# Patient Record
Sex: FEMALE | Race: WHITE | Employment: OTHER | URBAN - METROPOLITAN AREA
[De-identification: names, ages, dates, MRNs, and addresses within clinical notes are randomized per-mention and may not be internally consistent; named-entity substitution may affect disease eponyms.]

---

## 2017-04-04 ENCOUNTER — OFFICE VISIT (OUTPATIENT)
Dept: INTERNAL MEDICINE CLINIC | Facility: CLINIC | Age: 77
End: 2017-04-04

## 2017-04-04 VITALS
WEIGHT: 185 LBS | HEIGHT: 66 IN | TEMPERATURE: 98 F | OXYGEN SATURATION: 96 % | SYSTOLIC BLOOD PRESSURE: 116 MMHG | DIASTOLIC BLOOD PRESSURE: 74 MMHG | RESPIRATION RATE: 15 BRPM | BODY MASS INDEX: 29.73 KG/M2 | HEART RATE: 66 BPM

## 2017-04-04 DIAGNOSIS — E78.2 MIXED HYPERLIPIDEMIA: ICD-10-CM

## 2017-04-04 DIAGNOSIS — E66.3 OVERWEIGHT (BMI 25.0-29.9): ICD-10-CM

## 2017-04-04 DIAGNOSIS — Z80.0 FAMILY HISTORY OF COLON CANCER: ICD-10-CM

## 2017-04-04 DIAGNOSIS — I25.10 CORONARY ARTERY DISEASE INVOLVING NATIVE CORONARY ARTERY OF NATIVE HEART WITHOUT ANGINA PECTORIS: ICD-10-CM

## 2017-04-04 DIAGNOSIS — Z00.00 ENCOUNTER FOR ANNUAL HEALTH EXAMINATION: Primary | ICD-10-CM

## 2017-04-04 DIAGNOSIS — M16.11 PRIMARY OSTEOARTHRITIS OF RIGHT HIP: ICD-10-CM

## 2017-04-04 DIAGNOSIS — I10 ESSENTIAL HYPERTENSION: ICD-10-CM

## 2017-04-04 PROCEDURE — 96160 PT-FOCUSED HLTH RISK ASSMT: CPT | Performed by: INTERNAL MEDICINE

## 2017-04-04 PROCEDURE — G0439 PPPS, SUBSEQ VISIT: HCPCS | Performed by: INTERNAL MEDICINE

## 2017-04-04 PROCEDURE — 99397 PER PM REEVAL EST PAT 65+ YR: CPT | Performed by: INTERNAL MEDICINE

## 2017-04-04 RX ORDER — LISINOPRIL 20 MG/1
20 TABLET ORAL DAILY
COMMUNITY
Start: 2017-02-09 | End: 2019-09-23

## 2017-04-04 NOTE — PATIENT INSTRUCTIONS
Please have your blood-work completed in May, 2017. Please bring in a copy of your healthcare living will and medical power of . You need 3 eight ounce servings of calcium/vitamin D daily.  This includes spinach, kale, broccoli, almonds, milk,

## 2017-04-04 NOTE — PROGRESS NOTES
HPI:   Brittnee Swift is a 65year old female who presents for a medicare supervisit and additional issues as noted below. 1. HTN: tolerating lisinopril and metoprolol well. Has not been measuring her home pressures.   2. HLP: tolerating simvastati onset: 79) in her mother; Diabetes in her sister. There is no history of 8 siblings. SOCIAL HISTORY:   She  reports that she quit smoking about 37 years ago. Her smoking use included Cigarettes. She has a 30 pack-year smoking history.  She does not have a clubbing or edema      Visual Acuity  Right Eye Visual Acuity: Uncorrected Right Eye Chart Acuity: 20/30   Left Eye Visual Acuity: Uncorrected Left Eye Chart Acuity: 20/25   Both Eyes Visual Acuity: Uncorrected Both Eyes Chart Acuity: 20/25   Able To [employer] Functional Ability     Bathing or Showering: Able without help    Toileting: Able without help    Dressing: Able without help    Eating: Able without help    Driving: Able without help    Preparing your meals: Able without help    Managing money/bills: patient  PREVENTATIVE SERVICES  INDICATIONS AND SCHEDULE Internal Lab or Procedure External Lab or Procedure   Diabetes Screening      HbgA1C   Annually   Lab Results  Component Value Date   A1C 6.0* 09/23/2013   HGBA1C 6.0* 10/10/2015    No flowsheet data Pneumococcal 13 (Prevnar)  Covered Once after 65 No vaccine history found Please get once after your 65th birthday    Pneumococcal 23 (Pneumovax)  Covered Once after 65 No vaccine history found Please get once after your 65th birthday    Hepatitis B for Mo 05/18/2016 56     LDL-CHOLESTEROL (mg/dL (calc))   Date Value   09/23/2013 53     LDL CHOLESTROL (mg/dL)   Date Value   04/16/2013 84    No flowsheet data found. Dilated Eye exam  Annually No flowsheet data found. No flowsheet data found.     COPD

## 2017-04-25 ENCOUNTER — PRIOR ORIGINAL RECORDS (OUTPATIENT)
Dept: OTHER | Age: 77
End: 2017-04-25

## 2017-04-25 ENCOUNTER — HOSPITAL ENCOUNTER (OUTPATIENT)
Dept: LAB | Facility: HOSPITAL | Age: 77
Discharge: HOME | End: 2017-04-25
Attending: INTERNAL MEDICINE
Payer: MEDICARE

## 2017-04-25 DIAGNOSIS — Z00.00 ENCOUNTER FOR ANNUAL HEALTH EXAMINATION: ICD-10-CM

## 2017-04-25 PROCEDURE — 36415 COLL VENOUS BLD VENIPUNCTURE: CPT | Performed by: INTERNAL MEDICINE

## 2017-04-25 PROCEDURE — 80053 COMPREHEN METABOLIC PANEL: CPT | Performed by: INTERNAL MEDICINE

## 2017-04-25 PROCEDURE — 80061 LIPID PANEL: CPT | Performed by: INTERNAL MEDICINE

## 2017-04-26 LAB
ALKALINE PHOSPHATATE(ALK PHOS): 54 IU/L
BILIRUBIN TOTAL: 0.6 MG/DL
BUN: 20 MG/DL
CALCIUM: 9.5 MG/DL
CHLORIDE: 100 MEQ/L
CHOLESTEROL, TOTAL: 162 MG/DL
CREATININE, SERUM: 0.93 MG/DL
GLUCOSE: 91 MG/DL
HDL CHOLESTEROL: 73 MG/DL
LDL CHOLESTEROL: 65 MG/DL
POTASSIUM, SERUM: 4.3 MEQ/L
PROTEIN, TOTAL: 7.7 G/DL
SGOT (AST): 29 IU/L
SGPT (ALT): 36 IU/L
SODIUM: 136 MEQ/L
TRIGLYCERIDES: 122 MG/DL

## 2017-05-09 ENCOUNTER — PRIOR ORIGINAL RECORDS (OUTPATIENT)
Dept: OTHER | Age: 77
End: 2017-05-09

## 2017-07-24 PROBLEM — S80.01XA CONTUSION OF RIGHT KNEE: Status: ACTIVE | Noted: 2017-07-24

## 2017-07-24 PROBLEM — M17.11 OSTEOARTHRITIS OF RIGHT KNEE, UNSPECIFIED OSTEOARTHRITIS TYPE: Status: ACTIVE | Noted: 2017-07-24

## 2017-08-07 ENCOUNTER — OFFICE VISIT (OUTPATIENT)
Dept: INTERNAL MEDICINE CLINIC | Facility: CLINIC | Age: 77
End: 2017-08-07

## 2017-08-07 ENCOUNTER — TELEPHONE (OUTPATIENT)
Dept: INTERNAL MEDICINE CLINIC | Facility: CLINIC | Age: 77
End: 2017-08-07

## 2017-08-07 VITALS
DIASTOLIC BLOOD PRESSURE: 70 MMHG | WEIGHT: 174.25 LBS | HEIGHT: 66 IN | RESPIRATION RATE: 12 BRPM | BODY MASS INDEX: 28 KG/M2 | OXYGEN SATURATION: 98 % | TEMPERATURE: 98 F | SYSTOLIC BLOOD PRESSURE: 130 MMHG | HEART RATE: 61 BPM

## 2017-08-07 DIAGNOSIS — E78.2 MIXED HYPERLIPIDEMIA: ICD-10-CM

## 2017-08-07 DIAGNOSIS — I10 ESSENTIAL HYPERTENSION: ICD-10-CM

## 2017-08-07 DIAGNOSIS — R25.2 LEG CRAMPING: Primary | ICD-10-CM

## 2017-08-07 DIAGNOSIS — I25.10 CORONARY ARTERY DISEASE INVOLVING NATIVE CORONARY ARTERY OF NATIVE HEART WITHOUT ANGINA PECTORIS: ICD-10-CM

## 2017-08-07 PROCEDURE — 99214 OFFICE O/P EST MOD 30 MIN: CPT | Performed by: INTERNAL MEDICINE

## 2017-08-07 RX ORDER — LORAZEPAM 0.5 MG/1
0.5 TABLET ORAL
Qty: 4 TABLET | Refills: 0 | Status: SHIPPED | OUTPATIENT
Start: 2017-08-07 | End: 2017-12-20

## 2017-08-07 NOTE — PATIENT INSTRUCTIONS
For your upcoming flight, please try over the counter benadryl 25-50 mg first. If this is not effective in 60 minutes, you can use the prescription ativan (lorazepam). You really need to try to drink 6-8 eight ounce glasses of water daily.  Please drink changes, such as controlling caffeine intake, alcohol, and smoking. Date Last Reviewed: 7/18/2015  © 5595-8872 The [address] [place] [address], [address] [place] [address]. All rights reserved.  This information is not intended as a substitute for also need to take supplements. · Manage stress and learn ways to relax. Deep breathing techniques and visualization can help to relax your muscles and calm your mind. · Exercise regularly. It can help reduce stress.  Also, you will have more energy during

## 2017-08-07 NOTE — PROGRESS NOTES
Jaja Ning is a 65year old female. HPI:   Patient presents for the followin. Bilateral lower extremity cramping but worse in RLE: usually more prominent at night but periodically will happen during the day if she is sitting too long.  They Allergies    Family History   Problem Relation Age of Onset   • Breast Cancer Mother 79   • Diabetes Sister       Past Medical History:   Diagnosis Date   • Cancer [facility])    • Coronary artery disease    • Deafness in left ear    • History of open heart surg lisinopril 40 mg. Home machine is accurate.    # Right Hip OA: doing well with her HEP  # Overweight, BMI 29: weight is stable. She is very healthy.    # Health Maintenance: medicare wellness exam 4/4/2017  Colon Cancer Screening: colonoscopy in 2011 but co

## 2017-09-14 ENCOUNTER — HOSPITAL ENCOUNTER (OUTPATIENT)
Dept: LAB | Facility: HOSPITAL | Age: 77
Discharge: HOME | End: 2017-09-14
Attending: INTERNAL MEDICINE
Payer: MEDICARE

## 2017-09-14 ENCOUNTER — PRIOR ORIGINAL RECORDS (OUTPATIENT)
Dept: OTHER | Age: 77
End: 2017-09-14

## 2017-09-14 DIAGNOSIS — R25.2 LEG CRAMPING: ICD-10-CM

## 2017-09-14 DIAGNOSIS — I25.10 CORONARY ARTERY DISEASE INVOLVING NATIVE CORONARY ARTERY OF NATIVE HEART WITHOUT ANGINA PECTORIS: ICD-10-CM

## 2017-09-14 DIAGNOSIS — E78.2 MIXED HYPERLIPIDEMIA: ICD-10-CM

## 2017-09-14 DIAGNOSIS — I10 ESSENTIAL HYPERTENSION: ICD-10-CM

## 2017-09-14 LAB
ALT (SGPT): 32 U/L
ALT SERPL-CCNC: 32 U/L (ref 14–54)
AST (SGOT): 34 U/L
AST SERPL-CCNC: 34 U/L (ref 15–41)
BUN BLD-MCNC: 20 MG/DL (ref 8–20)
CALCIUM BLD-MCNC: 9.1 MG/DL (ref 8.3–10.3)
CHLORIDE: 103 MMOL/L (ref 101–111)
CHOLEST SMN-MCNC: 155 MG/DL (ref ?–200)
CHOLESTEROL, TOTAL: 155 MG/DL
CO2: 30 MMOL/L (ref 22–32)
CREAT BLD-MCNC: 1.03 MG/DL (ref 0.55–1.02)
GLUCOSE BLD-MCNC: 86 MG/DL (ref 70–99)
HAV IGM SER QL: 2.5 MG/DL (ref 1.7–3)
HDL CHOLESTEROL: 80 MG/DL
HDLC SERPL-MCNC: 80 MG/DL (ref 45–?)
HDLC SERPL: 1.94 {RATIO} (ref ?–4.44)
LDL CHOLESTEROL: 59 MG/DL
LDLC SERPL CALC-MCNC: 59 MG/DL (ref ?–130)
LDLC SERPL-MCNC: 16 MG/DL (ref 5–40)
NONHDLC SERPL-MCNC: 75 MG/DL (ref ?–130)
PHOSPHATE SERPL-MCNC: 3 MG/DL (ref 2.5–4.9)
POTASSIUM SERPL-SCNC: 4.5 MMOL/L (ref 3.6–5.1)
SODIUM SERPL-SCNC: 137 MMOL/L (ref 136–144)
TRIGLYCERIDES: 79 MG/DL
TRIGLYCERIDES: 79 MG/DL (ref ?–150)

## 2017-09-14 PROCEDURE — 84100 ASSAY OF PHOSPHORUS: CPT

## 2017-09-14 PROCEDURE — 84450 TRANSFERASE (AST) (SGOT): CPT | Performed by: INTERNAL MEDICINE

## 2017-09-14 PROCEDURE — 80048 BASIC METABOLIC PNL TOTAL CA: CPT

## 2017-09-14 PROCEDURE — 36415 COLL VENOUS BLD VENIPUNCTURE: CPT | Performed by: INTERNAL MEDICINE

## 2017-09-14 PROCEDURE — 84460 ALANINE AMINO (ALT) (SGPT): CPT | Performed by: INTERNAL MEDICINE

## 2017-09-14 PROCEDURE — 83735 ASSAY OF MAGNESIUM: CPT

## 2017-09-14 PROCEDURE — 80061 LIPID PANEL: CPT | Performed by: INTERNAL MEDICINE

## 2017-12-19 ENCOUNTER — PRIOR ORIGINAL RECORDS (OUTPATIENT)
Dept: OTHER | Age: 77
End: 2017-12-19

## 2017-12-20 ENCOUNTER — OFFICE VISIT (OUTPATIENT)
Dept: INTERNAL MEDICINE CLINIC | Facility: CLINIC | Age: 77
End: 2017-12-20

## 2017-12-20 VITALS
OXYGEN SATURATION: 97 % | RESPIRATION RATE: 16 BRPM | TEMPERATURE: 97 F | BODY MASS INDEX: 29 KG/M2 | HEART RATE: 76 BPM | SYSTOLIC BLOOD PRESSURE: 170 MMHG | WEIGHT: 182.5 LBS | DIASTOLIC BLOOD PRESSURE: 74 MMHG

## 2017-12-20 DIAGNOSIS — J01.90 ACUTE NON-RECURRENT SINUSITIS, UNSPECIFIED LOCATION: Primary | ICD-10-CM

## 2017-12-20 PROCEDURE — 99213 OFFICE O/P EST LOW 20 MIN: CPT | Performed by: NURSE PRACTITIONER

## 2017-12-20 RX ORDER — AZITHROMYCIN 250 MG/1
TABLET, FILM COATED ORAL
Qty: 6 TABLET | Refills: 0 | Status: SHIPPED | OUTPATIENT
Start: 2017-12-20 | End: 2018-05-14

## 2017-12-20 NOTE — PROGRESS NOTES
CHIEF COMPLAINT:   Patient presents with:  URI      HPI:   Lianna Lim is a 65year old female who presents for upper respiratory symptoms for  3 days.  Patient reports sore throat only at the beginning of sx's, congestion, cough with yellow/clear co N/V/C or abdominal pain  NEURO: + frontal headaches    EXAM:   BP (!) 170/74 (BP Location: Left arm, Patient Position: Sitting, Cuff Size: large)   Pulse 76   Temp (!) 97.2 °F (36.2 °C) (Oral)   Resp 16   Wt 182 lb 8 oz   SpO2 97%   Breastfeeding?  No   BMI

## 2018-03-09 ENCOUNTER — HOSPITAL (OUTPATIENT)
Dept: OTHER | Age: 78
End: 2018-03-09
Attending: FAMILY MEDICINE

## 2018-03-12 NOTE — PROGRESS NOTES
Xray left wrist on 3/10/2018: no fracture or dislocation of wrist. Moderate degenerative changes in left wrist articulation.

## 2018-05-11 ENCOUNTER — PRIOR ORIGINAL RECORDS (OUTPATIENT)
Dept: OTHER | Age: 78
End: 2018-05-11

## 2018-05-11 ENCOUNTER — MYAURORA ACCOUNT LINK (OUTPATIENT)
Dept: OTHER | Age: 78
End: 2018-05-11

## 2018-05-14 ENCOUNTER — OFFICE VISIT (OUTPATIENT)
Dept: INTERNAL MEDICINE CLINIC | Facility: CLINIC | Age: 78
End: 2018-05-14

## 2018-05-14 VITALS
HEART RATE: 68 BPM | DIASTOLIC BLOOD PRESSURE: 70 MMHG | BODY MASS INDEX: 29.99 KG/M2 | WEIGHT: 180 LBS | TEMPERATURE: 98 F | HEIGHT: 65 IN | SYSTOLIC BLOOD PRESSURE: 136 MMHG | RESPIRATION RATE: 16 BRPM

## 2018-05-14 DIAGNOSIS — E78.2 MIXED HYPERLIPIDEMIA: ICD-10-CM

## 2018-05-14 DIAGNOSIS — E66.3 OVERWEIGHT (BMI 25.0-29.9): ICD-10-CM

## 2018-05-14 DIAGNOSIS — Z00.00 ROUTINE GENERAL MEDICAL EXAMINATION AT A HEALTH CARE FACILITY: Primary | ICD-10-CM

## 2018-05-14 DIAGNOSIS — I10 ESSENTIAL HYPERTENSION: ICD-10-CM

## 2018-05-14 DIAGNOSIS — M16.11 PRIMARY OSTEOARTHRITIS OF RIGHT HIP: ICD-10-CM

## 2018-05-14 DIAGNOSIS — I25.10 CORONARY ARTERY DISEASE INVOLVING NATIVE CORONARY ARTERY OF NATIVE HEART WITHOUT ANGINA PECTORIS: ICD-10-CM

## 2018-05-14 PROBLEM — J44.9 ASTHMA WITH COPD (CHRONIC OBSTRUCTIVE PULMONARY DISEASE) (HCC): Chronic | Status: RESOLVED | Noted: 2018-05-14 | Resolved: 2018-05-14

## 2018-05-14 PROBLEM — S80.01XA CONTUSION OF RIGHT KNEE: Status: RESOLVED | Noted: 2017-07-24 | Resolved: 2018-05-14

## 2018-05-14 PROBLEM — J44.89 ASTHMA WITH COPD (CHRONIC OBSTRUCTIVE PULMONARY DISEASE) (HCC): Chronic | Status: RESOLVED | Noted: 2018-05-14 | Resolved: 2018-05-14

## 2018-05-14 PROBLEM — J44.89 ASTHMA WITH COPD (CHRONIC OBSTRUCTIVE PULMONARY DISEASE) (HCC): Chronic | Status: ACTIVE | Noted: 2018-05-14

## 2018-05-14 PROBLEM — J44.9 ASTHMA WITH COPD (CHRONIC OBSTRUCTIVE PULMONARY DISEASE) (HCC): Chronic | Status: ACTIVE | Noted: 2018-05-14

## 2018-05-14 PROBLEM — J44.89 ASTHMA WITH COPD (CHRONIC OBSTRUCTIVE PULMONARY DISEASE): Chronic | Status: RESOLVED | Noted: 2018-05-14 | Resolved: 2018-05-14

## 2018-05-14 PROBLEM — M17.11 OSTEOARTHRITIS OF RIGHT KNEE, UNSPECIFIED OSTEOARTHRITIS TYPE: Status: RESOLVED | Noted: 2017-07-24 | Resolved: 2018-05-14

## 2018-05-14 PROBLEM — J44.89 ASTHMA WITH COPD (CHRONIC OBSTRUCTIVE PULMONARY DISEASE): Chronic | Status: ACTIVE | Noted: 2018-05-14

## 2018-05-14 PROCEDURE — 96160 PT-FOCUSED HLTH RISK ASSMT: CPT | Performed by: INTERNAL MEDICINE

## 2018-05-14 PROCEDURE — G0439 PPPS, SUBSEQ VISIT: HCPCS | Performed by: INTERNAL MEDICINE

## 2018-05-14 PROCEDURE — 99397 PER PM REEVAL EST PAT 65+ YR: CPT | Performed by: INTERNAL MEDICINE

## 2018-05-14 NOTE — PATIENT INSTRUCTIONS
You need 3 eight ounce servings of calcium/vitamin D daily. This includes spinach, kale, broccoli, almonds, milk, yogurt, or cottage cheese. Please bring in a copy of your healthcare living will and medical power of .      I also advise you get t

## 2018-05-14 NOTE — PROGRESS NOTES
David Gurrola is a 77year old female. HPI:   Patient presents for medicare supervisit and additional issues noted below. 1. HTN: does not measure BP at home. Seen by cardiology on May 11th and BP was perfect. No med adjustments.    2. HLP: doing adult)   Pulse 68   Temp 98 °F (36.7 °C) (Oral)   Resp 16   Ht 65\"   Wt 180 lb   BMI 29.95 kg/m²   Medicare hearing evaluation: b/l rub test is louder on right  GENERAL: A&O well developed, well nourished,in no apparent distress  SKIN: no rashes,no suspic

## 2018-06-08 ENCOUNTER — PRIOR ORIGINAL RECORDS (OUTPATIENT)
Dept: OTHER | Age: 78
End: 2018-06-08

## 2018-06-08 ENCOUNTER — HOSPITAL ENCOUNTER (OUTPATIENT)
Dept: LAB | Facility: HOSPITAL | Age: 78
Discharge: HOME | End: 2018-06-08
Attending: INTERNAL MEDICINE
Payer: MEDICARE

## 2018-06-08 DIAGNOSIS — E78.2 MIXED HYPERLIPIDEMIA: ICD-10-CM

## 2018-06-08 DIAGNOSIS — E66.3 OVERWEIGHT (BMI 25.0-29.9): ICD-10-CM

## 2018-06-08 DIAGNOSIS — I25.10 CORONARY ARTERY DISEASE INVOLVING NATIVE CORONARY ARTERY OF NATIVE HEART WITHOUT ANGINA PECTORIS: ICD-10-CM

## 2018-06-08 DIAGNOSIS — Z00.00 ROUTINE GENERAL MEDICAL EXAMINATION AT A HEALTH CARE FACILITY: ICD-10-CM

## 2018-06-08 DIAGNOSIS — I10 ESSENTIAL HYPERTENSION: ICD-10-CM

## 2018-06-08 DIAGNOSIS — M16.11 PRIMARY OSTEOARTHRITIS OF RIGHT HIP: ICD-10-CM

## 2018-06-08 PROCEDURE — 84450 TRANSFERASE (AST) (SGOT): CPT

## 2018-06-08 PROCEDURE — 80061 LIPID PANEL: CPT

## 2018-06-08 PROCEDURE — 84460 ALANINE AMINO (ALT) (SGPT): CPT

## 2018-06-08 PROCEDURE — 36415 COLL VENOUS BLD VENIPUNCTURE: CPT

## 2018-06-08 PROCEDURE — 80048 BASIC METABOLIC PNL TOTAL CA: CPT

## 2018-06-18 ENCOUNTER — PRIOR ORIGINAL RECORDS (OUTPATIENT)
Dept: OTHER | Age: 78
End: 2018-06-18

## 2018-06-18 ENCOUNTER — TELEPHONE (OUTPATIENT)
Dept: INTERNAL MEDICINE CLINIC | Facility: CLINIC | Age: 78
End: 2018-06-18

## 2018-06-18 LAB
ALT (SGPT): 45 U/L
AST (SGOT): 32 U/L
BUN: 17 MG/DL
CALCIUM: 9 MG/DL
CHLORIDE: 103 MEQ/L
CHOLESTEROL, TOTAL: 142 MG/DL
CREATININE, SERUM: 0.86 MG/DL
GLUCOSE: 95 MG/DL
GLUCOSE: 95 MG/DL
HDL CHOLESTEROL: 67 MG/DL
LDL CHOLESTEROL: 55 MG/DL
NON-HDL CHOLESTEROL: 75 MG/DL
POTASSIUM, SERUM: 4.1 MEQ/L
SODIUM: 139 MEQ/L
TRIGLYCERIDES: 98 MG/DL

## 2018-06-18 NOTE — TELEPHONE ENCOUNTER
Patient calling for lab results.   Does not use [employer] any longer - cannot receive any results through [employer]

## 2019-02-28 VITALS
HEART RATE: 56 BPM | SYSTOLIC BLOOD PRESSURE: 126 MMHG | BODY MASS INDEX: 28.61 KG/M2 | WEIGHT: 178 LBS | DIASTOLIC BLOOD PRESSURE: 64 MMHG | HEIGHT: 66 IN

## 2019-03-01 VITALS
HEART RATE: 92 BPM | SYSTOLIC BLOOD PRESSURE: 132 MMHG | WEIGHT: 184 LBS | DIASTOLIC BLOOD PRESSURE: 64 MMHG | HEIGHT: 66 IN | BODY MASS INDEX: 29.57 KG/M2

## 2019-04-08 RX ORDER — METOPROLOL TARTRATE 50 MG/1
TABLET, FILM COATED ORAL
COMMUNITY
Start: 2017-07-17 | End: 2019-07-14 | Stop reason: SDUPTHER

## 2019-04-08 RX ORDER — DIPHENOXYLATE HYDROCHLORIDE AND ATROPINE SULFATE 2.5; .025 MG/1; MG/1
TABLET ORAL
COMMUNITY
End: 2020-12-16

## 2019-04-08 RX ORDER — SIMVASTATIN 40 MG
TABLET ORAL
COMMUNITY
Start: 2017-10-16 | End: 2019-07-14 | Stop reason: SDUPTHER

## 2019-04-08 RX ORDER — LISINOPRIL 20 MG/1
TABLET ORAL
COMMUNITY
Start: 2017-10-16 | End: 2019-07-14 | Stop reason: SDUPTHER

## 2019-06-14 ENCOUNTER — APPOINTMENT (OUTPATIENT)
Dept: CARDIOLOGY | Age: 79
End: 2019-06-14

## 2019-06-20 ENCOUNTER — TELEPHONE (OUTPATIENT)
Dept: INTERNAL MEDICINE CLINIC | Facility: CLINIC | Age: 79
End: 2019-06-20

## 2019-06-20 ENCOUNTER — TELEPHONE (OUTPATIENT)
Dept: CARDIOLOGY | Age: 79
End: 2019-06-20

## 2019-06-20 DIAGNOSIS — E78.00 HYPERCHOLESTEREMIA: ICD-10-CM

## 2019-06-20 DIAGNOSIS — I25.10 CAD IN NATIVE ARTERY: Primary | ICD-10-CM

## 2019-06-20 NOTE — TELEPHONE ENCOUNTER
Patient has an appointment scheduled on 07/10/19 for her MAS and is requesting for her blood work orders to be inputted prior to her visit.

## 2019-06-21 ENCOUNTER — HOSPITAL ENCOUNTER (OUTPATIENT)
Dept: LAB | Facility: HOSPITAL | Age: 79
Discharge: HOME | End: 2019-06-21
Attending: INTERNAL MEDICINE
Payer: MEDICARE

## 2019-06-21 DIAGNOSIS — Z00.00 LABORATORY EXAM ORDERED AS PART OF ROUTINE GENERAL MEDICAL EXAMINATION: ICD-10-CM

## 2019-06-21 DIAGNOSIS — I10 ESSENTIAL HYPERTENSION: ICD-10-CM

## 2019-06-21 DIAGNOSIS — I25.10 CORONARY ARTERY DISEASE INVOLVING NATIVE CORONARY ARTERY OF NATIVE HEART WITHOUT ANGINA PECTORIS: ICD-10-CM

## 2019-06-21 PROCEDURE — 80048 BASIC METABOLIC PNL TOTAL CA: CPT

## 2019-06-21 PROCEDURE — 84460 ALANINE AMINO (ALT) (SGPT): CPT

## 2019-06-21 PROCEDURE — 80061 LIPID PANEL: CPT

## 2019-06-21 PROCEDURE — 84450 TRANSFERASE (AST) (SGOT): CPT

## 2019-06-21 PROCEDURE — 36415 COLL VENOUS BLD VENIPUNCTURE: CPT

## 2019-07-07 ENCOUNTER — MA CHART PREP (OUTPATIENT)
Dept: FAMILY MEDICINE CLINIC | Facility: CLINIC | Age: 79
End: 2019-07-07

## 2019-07-10 ENCOUNTER — OFFICE VISIT (OUTPATIENT)
Dept: INTERNAL MEDICINE CLINIC | Facility: CLINIC | Age: 79
End: 2019-07-10
Payer: COMMERCIAL

## 2019-07-10 VITALS
RESPIRATION RATE: 16 BRPM | HEART RATE: 80 BPM | WEIGHT: 178.5 LBS | HEIGHT: 65 IN | OXYGEN SATURATION: 94 % | TEMPERATURE: 98 F | BODY MASS INDEX: 29.74 KG/M2 | SYSTOLIC BLOOD PRESSURE: 134 MMHG | DIASTOLIC BLOOD PRESSURE: 70 MMHG

## 2019-07-10 DIAGNOSIS — F32.0 CURRENT MILD EPISODE OF MAJOR DEPRESSIVE DISORDER WITHOUT PRIOR EPISODE (HCC): ICD-10-CM

## 2019-07-10 DIAGNOSIS — E78.2 MIXED HYPERLIPIDEMIA: ICD-10-CM

## 2019-07-10 DIAGNOSIS — E66.3 OVERWEIGHT (BMI 25.0-29.9): ICD-10-CM

## 2019-07-10 DIAGNOSIS — I25.10 CORONARY ARTERY DISEASE INVOLVING NATIVE CORONARY ARTERY OF NATIVE HEART WITHOUT ANGINA PECTORIS: Primary | ICD-10-CM

## 2019-07-10 DIAGNOSIS — I10 ESSENTIAL HYPERTENSION: ICD-10-CM

## 2019-07-10 PROBLEM — F39 MOOD DISORDER (HCC): Status: ACTIVE | Noted: 2019-07-10

## 2019-07-10 PROCEDURE — 96160 PT-FOCUSED HLTH RISK ASSMT: CPT | Performed by: INTERNAL MEDICINE

## 2019-07-10 PROCEDURE — G0439 PPPS, SUBSEQ VISIT: HCPCS | Performed by: INTERNAL MEDICINE

## 2019-07-10 PROCEDURE — 99397 PER PM REEVAL EST PAT 65+ YR: CPT | Performed by: INTERNAL MEDICINE

## 2019-07-10 RX ORDER — CITALOPRAM 10 MG/1
10 TABLET ORAL DAILY
Qty: 30 TABLET | Refills: 1 | Status: SHIPPED | OUTPATIENT
Start: 2019-07-10 | End: 2019-09-23

## 2019-07-10 NOTE — PROGRESS NOTES
Vilma Segura is a [de-identified]year old female. HPI:   Patient presents for medicare supervisit and additional issues noted below. 1. CADz, HTN, HLP: tolerating medications well. 2. Overweight: weight is stable. She is doing water aerobics.  She also wal Procedure Laterality Date   • CABG  2011    1 vessel, LM      Social History:    Social History    Tobacco Use      Smoking status: Former Smoker        Packs/day: 1.50        Years: 20.00        Pack years: 30        Types: Cigarettes        Quit date: Breast Cancer Screening: normal mammogram in 5/2016, no longer wants screening. Bone Health: on calcium/vit D, already doing weight bearing exercises. DEXA normal 3/2005. Repeat in 2020. Vaccines: Shingles 2013, Pneumovac 2013. Prevnar 13 in 2016.  TD

## 2019-07-14 ENCOUNTER — TELEPHONE (OUTPATIENT)
Dept: CARDIOLOGY | Age: 79
End: 2019-07-14

## 2019-07-15 ENCOUNTER — TELEPHONE (OUTPATIENT)
Dept: INTERNAL MEDICINE CLINIC | Facility: CLINIC | Age: 79
End: 2019-07-15

## 2019-07-15 NOTE — TELEPHONE ENCOUNTER
Patient was in for OV and was prescribed Citalopram Hydrobromide 10 MG.  Patient decided that she will not be taking this medication  Please call if there's any issues

## 2019-07-16 ENCOUNTER — TELEPHONE (OUTPATIENT)
Dept: CARDIOLOGY | Age: 79
End: 2019-07-16

## 2019-07-16 RX ORDER — METOPROLOL TARTRATE 50 MG/1
TABLET, FILM COATED ORAL
Qty: 45 TABLET | Refills: 1 | Status: SHIPPED | OUTPATIENT
Start: 2019-07-16 | End: 2020-12-29 | Stop reason: SDUPTHER

## 2019-07-16 RX ORDER — SIMVASTATIN 40 MG
TABLET ORAL
Qty: 30 TABLET | Refills: 1 | Status: SHIPPED | OUTPATIENT
Start: 2019-07-16 | End: 2020-12-16

## 2019-07-16 RX ORDER — LISINOPRIL 20 MG/1
TABLET ORAL
Qty: 30 TABLET | Refills: 1 | Status: SHIPPED | OUTPATIENT
Start: 2019-07-16 | End: 2020-12-16

## 2019-07-26 ENCOUNTER — APPOINTMENT (OUTPATIENT)
Dept: CARDIOLOGY | Age: 79
End: 2019-07-26

## 2019-09-23 ENCOUNTER — OFFICE VISIT (OUTPATIENT)
Dept: INTERNAL MEDICINE CLINIC | Facility: CLINIC | Age: 79
End: 2019-09-23
Payer: COMMERCIAL

## 2019-09-23 ENCOUNTER — HOSPITAL ENCOUNTER (OUTPATIENT)
Dept: GENERAL RADIOLOGY | Age: 79
Discharge: HOME | End: 2019-09-23
Attending: INTERNAL MEDICINE
Payer: MEDICARE

## 2019-09-23 VITALS
DIASTOLIC BLOOD PRESSURE: 84 MMHG | HEIGHT: 65 IN | HEART RATE: 64 BPM | BODY MASS INDEX: 30.41 KG/M2 | WEIGHT: 182.5 LBS | SYSTOLIC BLOOD PRESSURE: 140 MMHG | RESPIRATION RATE: 16 BRPM | TEMPERATURE: 98 F

## 2019-09-23 DIAGNOSIS — R07.89 RIGHT-SIDED CHEST WALL PAIN: ICD-10-CM

## 2019-09-23 DIAGNOSIS — E78.2 MIXED HYPERLIPIDEMIA: ICD-10-CM

## 2019-09-23 DIAGNOSIS — Z91.81 STATUS POST FALL: Primary | ICD-10-CM

## 2019-09-23 DIAGNOSIS — I10 ESSENTIAL HYPERTENSION: ICD-10-CM

## 2019-09-23 DIAGNOSIS — Z91.81 STATUS POST FALL: ICD-10-CM

## 2019-09-23 DIAGNOSIS — I25.10 CORONARY ARTERY DISEASE INVOLVING NATIVE CORONARY ARTERY OF NATIVE HEART WITHOUT ANGINA PECTORIS: ICD-10-CM

## 2019-09-23 DIAGNOSIS — M25.531 RIGHT WRIST PAIN: ICD-10-CM

## 2019-09-23 PROCEDURE — 99214 OFFICE O/P EST MOD 30 MIN: CPT | Performed by: INTERNAL MEDICINE

## 2019-09-23 PROCEDURE — 71101 X-RAY EXAM UNILAT RIBS/CHEST: CPT | Performed by: INTERNAL MEDICINE

## 2019-09-23 RX ORDER — SIMVASTATIN 40 MG
40 TABLET ORAL NIGHTLY
Qty: 90 TABLET | Refills: 0 | Status: SHIPPED | OUTPATIENT
Start: 2019-09-23 | End: 2021-02-01

## 2019-09-23 RX ORDER — METOPROLOL TARTRATE 50 MG/1
TABLET, FILM COATED ORAL
Qty: 135 TABLET | Refills: 0 | Status: SHIPPED | OUTPATIENT
Start: 2019-09-23 | End: 2021-02-01 | Stop reason: DRUGHIGH

## 2019-09-23 RX ORDER — LISINOPRIL 20 MG/1
20 TABLET ORAL DAILY
Qty: 90 TABLET | Refills: 0 | Status: SHIPPED | OUTPATIENT
Start: 2019-09-23 | End: 2019-09-23

## 2019-09-23 RX ORDER — OMEPRAZOLE 40 MG/1
40 CAPSULE, DELAYED RELEASE ORAL DAILY
Qty: 30 CAPSULE | Refills: 0 | Status: SHIPPED | OUTPATIENT
Start: 2019-09-23 | End: 2019-10-30

## 2019-09-23 RX ORDER — SIMVASTATIN 40 MG
40 TABLET ORAL NIGHTLY
Qty: 90 TABLET | Refills: 0 | Status: SHIPPED | OUTPATIENT
Start: 2019-09-23 | End: 2019-09-23

## 2019-09-23 RX ORDER — LISINOPRIL 20 MG/1
20 TABLET ORAL DAILY
Qty: 90 TABLET | Refills: 0 | Status: SHIPPED | OUTPATIENT
Start: 2019-09-23 | End: 2021-02-01

## 2019-09-23 RX ORDER — METOPROLOL TARTRATE 50 MG/1
TABLET, FILM COATED ORAL
Qty: 135 TABLET | Refills: 0 | Status: SHIPPED | OUTPATIENT
Start: 2019-09-23 | End: 2019-09-23

## 2019-09-23 RX ORDER — MELOXICAM 7.5 MG/1
7.5 TABLET ORAL 2 TIMES DAILY PRN
Qty: 30 TABLET | Refills: 1 | Status: SHIPPED | OUTPATIENT
Start: 2019-09-23 | End: 2020-01-10

## 2019-09-23 NOTE — PROGRESS NOTES
Matthias Lowe is a [de-identified]year old female. HPI:   Patient presents with:  Fall: Pt states she fell 10 days ago on her front stoop. She feels pain to her right side rib area. She is going out of town and she doesnt feel well.  She is taking Aleve 3 x joni exertional chest pain  GI: denies nausea/emesis/ abdominal pain diarrhea constipation  MUSCULOSKELETAL: right wrist, chest wall pain  NEURO: denies headaches  ALLERGY: No Known Allergies  PAST HISTORY:     Current Outpatient Medications:   •  Meloxicam 7.5 atraumatic, PERRLA, EOMI, normal lid and conjunctiva  LUNGS: clear to auscultation bilaterally, no wheezing/rubs  CARDIO: RRR without murmurs. No clubbing, cyanosis or edema.   GI: soft non tender nondistended no hepatosplenomegaly, bowel sounds throughout Dispense: 90 tablet; Refill: 0    6. Coronary artery disease involving native coronary artery of native heart without angina pectoris  No anginal symptoms. On beta blocker, ACEi, aspirin.   In the process of switching cardiologists from Dr. Licha Duron to [facility]

## 2019-09-23 NOTE — PATIENT INSTRUCTIONS
- We will check rib x-rays today. This will help us figure out how long we expect your pain/symptoms to continue for. - Stop Aleve and take prescription anti-inflammatory (meloxicam) instead. Take 1 tablet twice daily as needed. Take with food.   - For

## 2019-09-24 ENCOUNTER — TELEPHONE (OUTPATIENT)
Dept: INTERNAL MEDICINE CLINIC | Facility: CLINIC | Age: 79
End: 2019-09-24

## 2019-09-24 NOTE — TELEPHONE ENCOUNTER
Pt informed of xray results. Pt states Meloxicam is not helping with pain and declining to change to a different medication.   Instructed to notify office if s/s worsen or persist.

## 2019-10-16 ENCOUNTER — TELEPHONE (OUTPATIENT)
Dept: INTERNAL MEDICINE CLINIC | Facility: CLINIC | Age: 79
End: 2019-10-16

## 2019-10-16 DIAGNOSIS — R42 POSTURAL DIZZINESS: ICD-10-CM

## 2019-10-16 DIAGNOSIS — Z91.81 STATUS POST FALL: Primary | ICD-10-CM

## 2019-10-16 NOTE — TELEPHONE ENCOUNTER
Recommend CT head without contrast (order placed), and follow up with Neurology (Dr. Baez  [phone]).

## 2019-10-16 NOTE — TELEPHONE ENCOUNTER
Patient provided with MD instructions listed below and verbalized understanding. She stated that our locations are too far from her home. Advised pts that she can contact insurance for neurologists closer to her home. Pt agreed.

## 2019-10-16 NOTE — TELEPHONE ENCOUNTER
Patient calling in, seeking recommendations on what she should do, or where she should be seen. Pt stated she fell and hit her head 90 days ago and still feels dizzy, especially when she bends over to put her shoes on. Please call pt to discuss.

## 2019-10-17 ENCOUNTER — HOSPITAL ENCOUNTER (OUTPATIENT)
Dept: CT IMAGING | Age: 79
Discharge: HOME | End: 2019-10-17
Attending: INTERNAL MEDICINE
Payer: MEDICARE

## 2019-10-17 ENCOUNTER — TELEPHONE (OUTPATIENT)
Dept: INTERNAL MEDICINE CLINIC | Facility: CLINIC | Age: 79
End: 2019-10-17

## 2019-10-17 DIAGNOSIS — Z91.81 STATUS POST FALL: Primary | ICD-10-CM

## 2019-10-17 DIAGNOSIS — R42 POSTURAL DIZZINESS: ICD-10-CM

## 2019-10-17 DIAGNOSIS — Z91.81 STATUS POST FALL: ICD-10-CM

## 2019-10-17 PROCEDURE — 70450 CT HEAD/BRAIN W/O DYE: CPT | Performed by: INTERNAL MEDICINE

## 2019-10-17 NOTE — TELEPHONE ENCOUNTER
No acute findings. No bleeding in the brain (which is main thing we were worried about with her symptoms). Some chronic inflammation noted in the left ear area - if she is having any left-sided ear pain, it might be worth doing a course of antibiotics.

## 2019-10-17 NOTE — TELEPHONE ENCOUNTER
Pt provided with test results. She denies L side ear pain/pressure. Pts story continued to change stating that she had original fall 1 month ago, when she stated yesterday she fell originally 3 months ago than today stated she fell again 9 days ago.

## 2019-10-17 NOTE — TELEPHONE ENCOUNTER
I spoke with pt to see which location pt would like to go to for CT as yesterday she stated our locations were too far. Pt stated that she would like to go to either [facility] or [place] locations.     Informed pt that we will contact insurance company

## 2019-10-17 NOTE — TELEPHONE ENCOUNTER
Pt waiting at the immediate care and wanting to know if she can go home. Results for stat CT as follows:    \"CONCLUSION:    1. Stable chronic microvascular changes, stable atrophy. No acute infarct or hemorrhage.   2. Again noted is sclerosis in the large

## 2019-10-17 NOTE — TELEPHONE ENCOUNTER
Please call radiology and change her CT scan order to STAT. It was originally ordered by Dr. Odalis Rivas. She fell and hit her head again a few days ago. She is set for CT scan for next week on Tuesday but would like it done ASAP, hence the STAT order.     Umu Garnica

## 2019-10-31 RX ORDER — OMEPRAZOLE 40 MG/1
CAPSULE, DELAYED RELEASE ORAL
Qty: 30 CAPSULE | Refills: 1 | Status: SHIPPED | OUTPATIENT
Start: 2019-10-31 | End: 2020-01-10

## 2019-10-31 NOTE — TELEPHONE ENCOUNTER
Failed protocol     Last refill:  9/23/2019 Omeprazole 40 mg #30 NR    LOV:   9/23/2019 Dr Katie Jon RTC ASAP with Dr Wallace Held   No FOV scheduled

## 2020-01-03 ENCOUNTER — TELEPHONE (OUTPATIENT)
Dept: INTERNAL MEDICINE CLINIC | Facility: CLINIC | Age: 80
End: 2020-01-03

## 2020-01-03 NOTE — TELEPHONE ENCOUNTER
Patient called stating that she went to [facility] on 1/1/20 for neck spasms. Patient was prescribed pain meds, but is unable to take them because they make her feel zooned out.  Requesting appointment as soon as possible, but next available isn't until

## 2020-01-03 NOTE — TELEPHONE ENCOUNTER
Per pt was prescribed Norco. Takes once a day. Muscle relaxer given at hospital.     Pt scheduled an ov w Dr Philippe Murphy for 1/10/2020.

## 2020-01-10 ENCOUNTER — OFFICE VISIT (OUTPATIENT)
Dept: INTERNAL MEDICINE CLINIC | Facility: CLINIC | Age: 80
End: 2020-01-10
Payer: COMMERCIAL

## 2020-01-10 VITALS
DIASTOLIC BLOOD PRESSURE: 70 MMHG | RESPIRATION RATE: 16 BRPM | TEMPERATURE: 98 F | HEIGHT: 65 IN | BODY MASS INDEX: 30.07 KG/M2 | SYSTOLIC BLOOD PRESSURE: 128 MMHG | OXYGEN SATURATION: 98 % | HEART RATE: 71 BPM | WEIGHT: 180.5 LBS

## 2020-01-10 DIAGNOSIS — I25.10 CORONARY ARTERY DISEASE INVOLVING NATIVE CORONARY ARTERY OF NATIVE HEART WITHOUT ANGINA PECTORIS: ICD-10-CM

## 2020-01-10 DIAGNOSIS — M54.2 NECK PAIN: ICD-10-CM

## 2020-01-10 DIAGNOSIS — M89.9 DISORDER OF BONE AND CARTILAGE: ICD-10-CM

## 2020-01-10 DIAGNOSIS — M94.9 DISORDER OF BONE AND CARTILAGE: ICD-10-CM
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
## 2020-01-10 DIAGNOSIS — I10 ESSENTIAL HYPERTENSION: ICD-10-CM

## 2020-01-10 DIAGNOSIS — E78.2 MIXED HYPERLIPIDEMIA: ICD-10-CM

## 2020-01-10 DIAGNOSIS — Z00.00 ROUTINE GENERAL MEDICAL EXAMINATION AT A HEALTH CARE FACILITY: Primary | ICD-10-CM

## 2020-01-10 PROBLEM — F39 MOOD DISORDER (HCC): Status: RESOLVED | Noted: 2019-07-10 | Resolved: 2020-01-10

## 2020-01-10 PROBLEM — F32.0 CURRENT MILD EPISODE OF MAJOR DEPRESSIVE DISORDER WITHOUT PRIOR EPISODE (HCC): Status: RESOLVED | Noted: 2019-07-10 | Resolved: 2020-01-10

## 2020-01-10 PROCEDURE — 99397 PER PM REEVAL EST PAT 65+ YR: CPT | Performed by: INTERNAL MEDICINE

## 2020-01-10 PROCEDURE — G0439 PPPS, SUBSEQ VISIT: HCPCS | Performed by: INTERNAL MEDICINE

## 2020-01-10 PROCEDURE — 96160 PT-FOCUSED HLTH RISK ASSMT: CPT | Performed by: INTERNAL MEDICINE

## 2020-01-10 NOTE — PROGRESS NOTES
Garland Mishra is a [de-identified]year old female. HPI:   Patient presents for medicare supervisit and additional issues noted below. 1. She presented to [facility] on 12/31/2019 for neck pain.  The pain started after she took down her ayden dec (82.8 kg)  07/10/19 : 178 lb 8 oz (81 kg)  05/14/18 : 180 lb (81.6 kg)  12/20/17 : 182 lb 8 oz (82.8 kg)  08/07/17 : 174 lb 4 oz (79 kg)      No Known Allergies    Family History   Problem Relation Age of Onset   • Breast Cancer Mother 79   • Diabetes Sist simvastatin, ASA  # HTN: well controlled on lisinopril 40 mg. Home machine is accurate.    # Obese, BMI 30: weight is stable. She is very healthy.    # Health Maintenance: medicare wellness exam 1/10/2020  Stress management: feels she saritha well with stress supervisit.   Nandini Campbell MD

## 2020-01-11 ENCOUNTER — TELEPHONE (OUTPATIENT)
Dept: INTERNAL MEDICINE CLINIC | Facility: CLINIC | Age: 80
End: 2020-01-11

## 2020-01-11 NOTE — TELEPHONE ENCOUNTER
Records Requested from:    [facility])    Fax: [phone]      Awaiting confirmation. Copy of form made and placed in teal accordion file. Original form sent to scanning.

## 2020-01-22 ENCOUNTER — PATIENT OUTREACH (OUTPATIENT)
Dept: CASE MANAGEMENT | Age: 80
End: 2020-01-22

## 2020-02-04 ENCOUNTER — PATIENT OUTREACH (OUTPATIENT)
Dept: CASE MANAGEMENT | Age: 80
End: 2020-02-04

## 2020-09-23 ENCOUNTER — TELEPHONE (OUTPATIENT)
Dept: CARDIOLOGY | Age: 80
End: 2020-09-23

## 2020-12-03 ENCOUNTER — TELEPHONE (OUTPATIENT)
Dept: CARDIOLOGY | Age: 80
End: 2020-12-03

## 2020-12-15 RX ORDER — AMIODARONE HYDROCHLORIDE 200 MG/1
TABLET ORAL
COMMUNITY
Start: 2020-09-24 | End: 2020-12-16

## 2020-12-15 RX ORDER — ATORVASTATIN CALCIUM 20 MG/1
20 TABLET, FILM COATED ORAL DAILY
COMMUNITY
End: 2021-01-08 | Stop reason: SDUPTHER

## 2020-12-15 RX ORDER — VALSARTAN AND HYDROCHLOROTHIAZIDE 160; 12.5 MG/1; MG/1
1 TABLET, FILM COATED ORAL DAILY
COMMUNITY
End: 2021-01-08 | Stop reason: SDUPTHER

## 2020-12-15 RX ORDER — AMLODIPINE BESYLATE 2.5 MG/1
2.5 TABLET ORAL DAILY
COMMUNITY
End: 2021-01-08 | Stop reason: SDUPTHER

## 2020-12-16 ENCOUNTER — OFFICE VISIT (OUTPATIENT)
Dept: CARDIOLOGY | Age: 80
End: 2020-12-16

## 2020-12-16 ENCOUNTER — TELEPHONE (OUTPATIENT)
Dept: CARDIOLOGY | Age: 80
End: 2020-12-16

## 2020-12-16 VITALS
SYSTOLIC BLOOD PRESSURE: 130 MMHG | WEIGHT: 175 LBS | HEIGHT: 66 IN | DIASTOLIC BLOOD PRESSURE: 64 MMHG | BODY MASS INDEX: 28.12 KG/M2 | HEART RATE: 62 BPM

## 2020-12-16 DIAGNOSIS — I25.10 CORONARY ARTERY DISEASE INVOLVING NATIVE HEART WITHOUT ANGINA PECTORIS, UNSPECIFIED VESSEL OR LESION TYPE: Primary | ICD-10-CM

## 2020-12-16 DIAGNOSIS — Z95.1 HX OF CABG: ICD-10-CM

## 2020-12-16 DIAGNOSIS — E78.00 HYPERCHOLESTEREMIA: ICD-10-CM

## 2020-12-16 DIAGNOSIS — I10 HYPERTENSION, UNSPECIFIED TYPE: ICD-10-CM

## 2020-12-16 DIAGNOSIS — R53.83 OTHER FATIGUE: ICD-10-CM

## 2020-12-16 DIAGNOSIS — I48.0 PAROXYSMAL ATRIAL FIBRILLATION (CMD): ICD-10-CM

## 2020-12-16 PROCEDURE — 99215 OFFICE O/P EST HI 40 MIN: CPT | Performed by: INTERNAL MEDICINE

## 2020-12-16 PROCEDURE — 93000 ELECTROCARDIOGRAM COMPLETE: CPT | Performed by: INTERNAL MEDICINE

## 2020-12-16 SDOH — HEALTH STABILITY: PHYSICAL HEALTH: ON AVERAGE, HOW MANY DAYS PER WEEK DO YOU ENGAGE IN MODERATE TO STRENUOUS EXERCISE (LIKE A BRISK WALK)?: 0 DAYS

## 2020-12-16 SDOH — HEALTH STABILITY: PHYSICAL HEALTH: ON AVERAGE, HOW MANY MINUTES DO YOU ENGAGE IN EXERCISE AT THIS LEVEL?: 0 MIN

## 2020-12-16 ASSESSMENT — ENCOUNTER SYMPTOMS
BRUISES/BLEEDS EASILY: 0
SUSPICIOUS LESIONS: 0
HEMATOCHEZIA: 0
ALLERGIC/IMMUNOLOGIC COMMENTS: NO NEW FOOD ALLERGIES
CHILLS: 0
WEIGHT LOSS: 0
HEMOPTYSIS: 0
WEIGHT GAIN: 0
COUGH: 0
FEVER: 0

## 2020-12-16 ASSESSMENT — PATIENT HEALTH QUESTIONNAIRE - PHQ9
SUM OF ALL RESPONSES TO PHQ9 QUESTIONS 1 AND 2: 0
SUM OF ALL RESPONSES TO PHQ9 QUESTIONS 1 AND 2: 0
2. FEELING DOWN, DEPRESSED OR HOPELESS: NOT AT ALL
CLINICAL INTERPRETATION OF PHQ2 SCORE: NO FURTHER SCREENING NEEDED
1. LITTLE INTEREST OR PLEASURE IN DOING THINGS: NOT AT ALL
CLINICAL INTERPRETATION OF PHQ9 SCORE: NO FURTHER SCREENING NEEDED

## 2020-12-29 RX ORDER — METOPROLOL TARTRATE 50 MG/1
TABLET, FILM COATED ORAL
Qty: 135 TABLET | Refills: 3 | Status: SHIPPED | OUTPATIENT
Start: 2020-12-29 | End: 2021-01-07 | Stop reason: DRUGHIGH

## 2021-01-01 ENCOUNTER — EXTERNAL RECORD (OUTPATIENT)
Dept: HEALTH INFORMATION MANAGEMENT | Facility: OTHER | Age: 81
End: 2021-01-01

## 2021-01-06 RX ORDER — SODIUM FLUORIDE 6 MG/ML
PASTE, DENTIFRICE DENTAL
COMMUNITY
Start: 2020-12-21

## 2021-01-07 ENCOUNTER — TELEPHONE (OUTPATIENT)
Dept: CARDIOLOGY | Age: 81
End: 2021-01-07

## 2021-01-07 ENCOUNTER — OFFICE VISIT (OUTPATIENT)
Dept: CARDIOLOGY | Age: 81
End: 2021-01-07

## 2021-01-07 VITALS
DIASTOLIC BLOOD PRESSURE: 50 MMHG | HEART RATE: 47 BPM | BODY MASS INDEX: 28.25 KG/M2 | WEIGHT: 175 LBS | SYSTOLIC BLOOD PRESSURE: 102 MMHG

## 2021-01-07 DIAGNOSIS — Z95.1 HX OF CABG: ICD-10-CM

## 2021-01-07 DIAGNOSIS — E78.00 HYPERCHOLESTEREMIA: ICD-10-CM

## 2021-01-07 DIAGNOSIS — I25.10 CORONARY ARTERY DISEASE INVOLVING NATIVE HEART WITHOUT ANGINA PECTORIS, UNSPECIFIED VESSEL OR LESION TYPE: Primary | ICD-10-CM

## 2021-01-07 DIAGNOSIS — I10 HYPERTENSION, UNSPECIFIED TYPE: ICD-10-CM

## 2021-01-07 DIAGNOSIS — I48.0 PAROXYSMAL ATRIAL FIBRILLATION (CMD): ICD-10-CM

## 2021-01-07 PROCEDURE — 3074F SYST BP LT 130 MM HG: CPT | Performed by: NURSE PRACTITIONER

## 2021-01-07 PROCEDURE — 99213 OFFICE O/P EST LOW 20 MIN: CPT | Performed by: NURSE PRACTITIONER

## 2021-01-07 PROCEDURE — 3078F DIAST BP <80 MM HG: CPT | Performed by: NURSE PRACTITIONER

## 2021-01-07 RX ORDER — METOPROLOL TARTRATE 50 MG/1
TABLET, FILM COATED ORAL
Qty: 135 TABLET | Refills: 3 | OUTPATIENT
Start: 2021-01-07

## 2021-01-07 ASSESSMENT — PATIENT HEALTH QUESTIONNAIRE - PHQ9
1. LITTLE INTEREST OR PLEASURE IN DOING THINGS: NOT AT ALL
CLINICAL INTERPRETATION OF PHQ2 SCORE: NO FURTHER SCREENING NEEDED
2. FEELING DOWN, DEPRESSED OR HOPELESS: NOT AT ALL
CLINICAL INTERPRETATION OF PHQ9 SCORE: NO FURTHER SCREENING NEEDED
SUM OF ALL RESPONSES TO PHQ9 QUESTIONS 1 AND 2: 0
SUM OF ALL RESPONSES TO PHQ9 QUESTIONS 1 AND 2: 0

## 2021-01-07 ASSESSMENT — ENCOUNTER SYMPTOMS
ORTHOPNEA: 0
BLOATING: 0
SHORTNESS OF BREATH: 0
SYNCOPE: 0
ABDOMINAL PAIN: 0
NIGHT SWEATS: 0
COUGH: 0
NEAR-SYNCOPE: 0
FEVER: 0

## 2021-01-08 ENCOUNTER — TELEPHONE (OUTPATIENT)
Dept: CARDIOLOGY | Age: 81
End: 2021-01-08

## 2021-01-08 RX ORDER — VALSARTAN AND HYDROCHLOROTHIAZIDE 160; 12.5 MG/1; MG/1
1 TABLET, FILM COATED ORAL DAILY
Qty: 90 TABLET | Refills: 3 | Status: SHIPPED | OUTPATIENT
Start: 2021-01-08

## 2021-01-08 RX ORDER — AMLODIPINE BESYLATE 2.5 MG/1
2.5 TABLET ORAL DAILY
Qty: 90 TABLET | Refills: 3 | Status: SHIPPED | OUTPATIENT
Start: 2021-01-08

## 2021-01-08 RX ORDER — ATORVASTATIN CALCIUM 20 MG/1
20 TABLET, FILM COATED ORAL DAILY
Qty: 90 TABLET | Refills: 0 | Status: SHIPPED | OUTPATIENT
Start: 2021-01-08 | End: 2021-05-06

## 2021-01-14 ENCOUNTER — TELEPHONE (OUTPATIENT)
Dept: CARDIOLOGY | Age: 81
End: 2021-01-14

## 2021-01-19 ENCOUNTER — TELEPHONE (OUTPATIENT)
Dept: CARDIOLOGY | Age: 81
End: 2021-01-19

## 2021-02-01 ENCOUNTER — OFFICE VISIT (OUTPATIENT)
Dept: INTERNAL MEDICINE CLINIC | Facility: CLINIC | Age: 81
End: 2021-02-01
Payer: COMMERCIAL

## 2021-02-01 VITALS
WEIGHT: 179.63 LBS | RESPIRATION RATE: 16 BRPM | OXYGEN SATURATION: 99 % | DIASTOLIC BLOOD PRESSURE: 60 MMHG | HEIGHT: 64.5 IN | HEART RATE: 54 BPM | SYSTOLIC BLOOD PRESSURE: 132 MMHG | TEMPERATURE: 98 F | BODY MASS INDEX: 30.29 KG/M2

## 2021-02-01 DIAGNOSIS — E66.09 CLASS 1 OBESITY DUE TO EXCESS CALORIES WITH SERIOUS COMORBIDITY AND BODY MASS INDEX (BMI) OF 30.0 TO 30.9 IN ADULT: ICD-10-CM

## 2021-02-01 DIAGNOSIS — Z00.00 ROUTINE GENERAL MEDICAL EXAMINATION AT A HEALTH CARE FACILITY: Primary | ICD-10-CM

## 2021-02-01 DIAGNOSIS — I48.0 PAF (PAROXYSMAL ATRIAL FIBRILLATION) (HCC): ICD-10-CM

## 2021-02-01 DIAGNOSIS — I10 ESSENTIAL HYPERTENSION: ICD-10-CM

## 2021-02-01 DIAGNOSIS — I25.10 CORONARY ARTERY DISEASE INVOLVING NATIVE CORONARY ARTERY OF NATIVE HEART WITHOUT ANGINA PECTORIS: ICD-10-CM

## 2021-02-01 DIAGNOSIS — E78.2 MIXED HYPERLIPIDEMIA: ICD-10-CM

## 2021-02-01 PROBLEM — E66.3 OVERWEIGHT (BMI 25.0-29.9): Status: RESOLVED | Noted: 2017-04-04 | Resolved: 2021-02-01

## 2021-02-01 PROCEDURE — 3078F DIAST BP <80 MM HG: CPT | Performed by: INTERNAL MEDICINE

## 2021-02-01 PROCEDURE — 96160 PT-FOCUSED HLTH RISK ASSMT: CPT | Performed by: INTERNAL MEDICINE

## 2021-02-01 PROCEDURE — 3075F SYST BP GE 130 - 139MM HG: CPT | Performed by: INTERNAL MEDICINE

## 2021-02-01 PROCEDURE — G0439 PPPS, SUBSEQ VISIT: HCPCS | Performed by: INTERNAL MEDICINE

## 2021-02-01 PROCEDURE — 99397 PER PM REEVAL EST PAT 65+ YR: CPT | Performed by: INTERNAL MEDICINE

## 2021-02-01 PROCEDURE — 3008F BODY MASS INDEX DOCD: CPT | Performed by: INTERNAL MEDICINE

## 2021-02-01 RX ORDER — VALSARTAN AND HYDROCHLOROTHIAZIDE 160; 12.5 MG/1; MG/1
1 TABLET, FILM COATED ORAL DAILY
COMMUNITY
Start: 2020-12-10

## 2021-02-01 RX ORDER — ATORVASTATIN CALCIUM 20 MG/1
20 TABLET, FILM COATED ORAL DAILY
COMMUNITY
Start: 2020-12-03

## 2021-02-01 RX ORDER — AMIODARONE HYDROCHLORIDE 200 MG/1
TABLET ORAL
COMMUNITY
Start: 2020-11-16 | End: 2021-02-01

## 2021-02-01 RX ORDER — APIXABAN 5 MG/1
5 TABLET, FILM COATED ORAL 2 TIMES DAILY
Status: ON HOLD | COMMUNITY
Start: 2021-01-07 | End: 2021-06-08

## 2021-02-01 RX ORDER — SODIUM FLUORIDE 6 MG/ML
PASTE, DENTIFRICE DENTAL
COMMUNITY
Start: 2020-12-21 | End: 2021-06-11

## 2021-02-01 RX ORDER — AMLODIPINE BESYLATE 2.5 MG/1
2.5 TABLET ORAL DAILY
Status: ON HOLD | COMMUNITY
Start: 2020-12-10 | End: 2021-06-08

## 2021-02-01 NOTE — PROGRESS NOTES
Jaja Ning is a 79year old female. HPI:   Patient presents for medicare supervisit and additional issues noted below. Last labs are in care everwhere in 9/2020  1. HTN: checking BP a few days per week and they are < 130/80s.  Matty [place] Pack years: 30        Types: Cigarettes        Quit date: 1980        Years since quittin.1      Smokeless tobacco: Never Used    Alcohol use: No      Alcohol/week: 0.0 standard drinks    Drug use: No           21  1059   BP: 140/62   Pulse: 2017. shingrix 2018. Gets yearly flu shot. Healthcare Living Will, Medical POA:   first. Would choose son, Opal Aliment otherwise.  Goals of care - full code but no prolonged life support    Care Team:  Elisha Stanton     The patient i

## 2021-02-01 NOTE — PATIENT INSTRUCTIONS
Please complete your fasting labs in March, 2021. You need 3 eight ounce servings of calcium/vitamin D daily. This includes spinach, kale, broccoli, almonds, milk, yogurt, or cottage cheese.

## 2021-02-24 ENCOUNTER — TELEPHONE (OUTPATIENT)
Dept: CARDIOLOGY | Age: 81
End: 2021-02-24

## 2021-03-03 DIAGNOSIS — Z23 NEED FOR VACCINATION: ICD-10-CM

## 2021-03-11 DIAGNOSIS — Z23 NEED FOR VACCINATION: ICD-10-CM

## 2021-05-06 ENCOUNTER — TELEPHONE (OUTPATIENT)
Dept: CARDIOLOGY | Age: 81
End: 2021-05-06

## 2021-05-06 RX ORDER — ATORVASTATIN CALCIUM 20 MG/1
TABLET, FILM COATED ORAL
Qty: 90 TABLET | Refills: 0 | Status: SHIPPED | OUTPATIENT
Start: 2021-05-06

## 2021-06-05 ENCOUNTER — APPOINTMENT (OUTPATIENT)
Dept: GENERAL RADIOLOGY | Facility: HOSPITAL | Age: 81
End: 2021-06-05
Attending: EMERGENCY MEDICINE
Payer: MEDICARE

## 2021-06-05 ENCOUNTER — HOSPITAL ENCOUNTER (OUTPATIENT)
Facility: HOSPITAL | Age: 81
Setting detail: OBSERVATION
Discharge: HOME | End: 2021-06-08
Attending: EMERGENCY MEDICINE | Admitting: INTERNAL MEDICINE
Payer: MEDICARE

## 2021-06-05 DIAGNOSIS — R53.1 WEAKNESS GENERALIZED: ICD-10-CM

## 2021-06-05 DIAGNOSIS — K92.2 UGIB (UPPER GASTROINTESTINAL BLEED): Primary | ICD-10-CM

## 2021-06-05 PROCEDURE — 99220 INITIAL OBSERVATION CARE,LEVL III: CPT | Performed by: INTERNAL MEDICINE

## 2021-06-05 PROCEDURE — 74018 RADEX ABDOMEN 1 VIEW: CPT | Performed by: EMERGENCY MEDICINE

## 2021-06-05 PROCEDURE — 71045 X-RAY EXAM CHEST 1 VIEW: CPT | Performed by: EMERGENCY MEDICINE

## 2021-06-05 RX ORDER — SODIUM CHLORIDE 9 MG/ML
INJECTION, SOLUTION INTRAVENOUS CONTINUOUS
Status: DISCONTINUED | OUTPATIENT
Start: 2021-06-05 | End: 2021-06-08

## 2021-06-05 RX ORDER — ONDANSETRON 2 MG/ML
4 INJECTION INTRAMUSCULAR; INTRAVENOUS EVERY 4 HOURS PRN
Status: DISCONTINUED | OUTPATIENT
Start: 2021-06-05 | End: 2021-06-05

## 2021-06-05 RX ORDER — ACETAMINOPHEN 325 MG/1
650 TABLET ORAL EVERY 6 HOURS PRN
Status: DISCONTINUED | OUTPATIENT
Start: 2021-06-05 | End: 2021-06-08

## 2021-06-05 RX ORDER — AMLODIPINE BESYLATE 2.5 MG/1
2.5 TABLET ORAL DAILY
Status: DISCONTINUED | OUTPATIENT
Start: 2021-06-05 | End: 2021-06-06

## 2021-06-05 RX ORDER — ONDANSETRON 2 MG/ML
4 INJECTION INTRAMUSCULAR; INTRAVENOUS EVERY 6 HOURS PRN
Status: DISCONTINUED | OUTPATIENT
Start: 2021-06-05 | End: 2021-06-08

## 2021-06-05 RX ORDER — ATORVASTATIN CALCIUM 20 MG/1
20 TABLET, FILM COATED ORAL DAILY
Status: DISCONTINUED | OUTPATIENT
Start: 2021-06-05 | End: 2021-06-08

## 2021-06-06 ENCOUNTER — ANESTHESIA (OUTPATIENT)
Dept: ENDOSCOPY | Facility: HOSPITAL | Age: 81
End: 2021-06-06
Payer: MEDICARE

## 2021-06-06 ENCOUNTER — ANESTHESIA EVENT (OUTPATIENT)
Dept: ENDOSCOPY | Facility: HOSPITAL | Age: 81
End: 2021-06-06
Payer: MEDICARE

## 2021-06-06 PROCEDURE — 99214 OFFICE O/P EST MOD 30 MIN: CPT | Performed by: INTERNAL MEDICINE

## 2021-06-06 PROCEDURE — 0DJ08ZZ INSPECTION OF UPPER INTESTINAL TRACT, VIA NATURAL OR ARTIFICIAL OPENING ENDOSCOPIC: ICD-10-PCS | Performed by: STUDENT IN AN ORGANIZED HEALTH CARE EDUCATION/TRAINING PROGRAM

## 2021-06-06 PROCEDURE — 99225 SUBSEQUENT OBSERVATION CARE: CPT | Performed by: INTERNAL MEDICINE

## 2021-06-06 RX ORDER — POTASSIUM CHLORIDE 14.9 MG/ML
20 INJECTION INTRAVENOUS ONCE
Status: COMPLETED | OUTPATIENT
Start: 2021-06-06 | End: 2021-06-06

## 2021-06-06 RX ORDER — ONDANSETRON 2 MG/ML
4 INJECTION INTRAMUSCULAR; INTRAVENOUS AS NEEDED
Status: CANCELLED | OUTPATIENT
Start: 2021-06-06 | End: 2021-06-06

## 2021-06-06 RX ORDER — METOCLOPRAMIDE HYDROCHLORIDE 5 MG/ML
10 INJECTION INTRAMUSCULAR; INTRAVENOUS AS NEEDED
Status: CANCELLED | OUTPATIENT
Start: 2021-06-06 | End: 2021-06-06

## 2021-06-06 RX ORDER — SODIUM CHLORIDE 9 MG/ML
INJECTION, SOLUTION INTRAVENOUS CONTINUOUS
Status: CANCELLED | OUTPATIENT
Start: 2021-06-06

## 2021-06-06 RX ORDER — LIDOCAINE HYDROCHLORIDE 10 MG/ML
INJECTION, SOLUTION EPIDURAL; INFILTRATION; INTRACAUDAL; PERINEURAL AS NEEDED
Status: DISCONTINUED | OUTPATIENT
Start: 2021-06-06 | End: 2021-06-06 | Stop reason: SURG

## 2021-06-06 RX ORDER — NALOXONE HYDROCHLORIDE 0.4 MG/ML
80 INJECTION, SOLUTION INTRAMUSCULAR; INTRAVENOUS; SUBCUTANEOUS AS NEEDED
Status: CANCELLED | OUTPATIENT
Start: 2021-06-06 | End: 2021-06-06

## 2021-06-06 RX ORDER — MAGNESIUM SULFATE HEPTAHYDRATE 40 MG/ML
2 INJECTION, SOLUTION INTRAVENOUS ONCE
Status: COMPLETED | OUTPATIENT
Start: 2021-06-06 | End: 2021-06-06

## 2021-06-06 RX ORDER — PHENYLEPHRINE HCL 10 MG/ML
VIAL (ML) INJECTION AS NEEDED
Status: DISCONTINUED | OUTPATIENT
Start: 2021-06-06 | End: 2021-06-06 | Stop reason: SURG

## 2021-06-06 RX ADMIN — SODIUM CHLORIDE: 9 INJECTION, SOLUTION INTRAVENOUS at 10:03:00

## 2021-06-06 RX ADMIN — PHENYLEPHRINE HCL 200 MCG: 10 MG/ML VIAL (ML) INJECTION at 09:54:00

## 2021-06-06 RX ADMIN — SODIUM CHLORIDE: 9 INJECTION, SOLUTION INTRAVENOUS at 09:50:00

## 2021-06-06 RX ADMIN — LIDOCAINE HYDROCHLORIDE 80 MG: 10 INJECTION, SOLUTION EPIDURAL; INFILTRATION; INTRACAUDAL; PERINEURAL at 09:50:00

## 2021-06-06 NOTE — OPERATIVE REPORT
EGD operative report  Patient Name: Yoni Loss  Procedure: Esophagogastroduodenoscopy   Indication: melena  Attending: Juanita Palafox M.D. Consent:  The risks, benefits, and alternatives were discussed with the patient / POA.   Risks included, but normal.  Impression: Findings as above. Recommendations:  1. Continue IV pantoprazole q12 hours (will need 8 weeks of therapy)  2. Continue to hold Eliquis (day 2)  3. If no further bleeding, clear liquids later today  4. Repeat Hgb later this PM  5.  If t

## 2021-06-06 NOTE — PROGRESS NOTES
NURSING ADMISSION NOTE      Patient admitted via Cart  Oriented to room. Safety precautions initiated. Bed in low position. Call light in reach.     At appx [phone] patient arrived to the floor accompanied by , patient able to ambulate to bed from ca

## 2021-06-06 NOTE — PLAN OF CARE
Patient A&Ox4, night uneventful since event at beginning of shift (please see previous note). No BM at this time. No further concerns. @1349 Per PCT patient with medium  loose, dark stool. @7550 Updates and POC given to son Thom Perales.   @4949 Patient with INTERVENTIONS:  - Assess pt frequently for physical needs  - Identify cognitive and physical deficits and behaviors that affect risk of falls.   - [place] fall precautions as indicated by assessment.  - Educate pt/family on patient safety including physic

## 2021-06-06 NOTE — PROGRESS NOTES
[facility]  Progress Note     Murray Isidoro Patient Status:  Observation    1940 MRN [id number]   Location [id number] [place] [address] Attending Best Calvillo MD   Hosp Day # 0 PCP Joel Irizarry MD     Chief Complaint: GI b 15.5*   INR 1.26* 1.19*            COVID-19 Lab Results    COVID-19  Lab Results   Component Value Date    COVID19 Not Detected 06/05/2021       Pro-Calcitonin  No results for input(s): PCT in the last 168 hours.     Cardiac  No results for input(s): TROP, Tin is expected to be discharge to: home    Plan of care discussed with patient, Luc Rodrigues MD

## 2021-06-06 NOTE — CONSULTS
[facility] [place]  Report of GI Consultation    Matthias Lowe Patient Status:  Observation    1940 MRN [id number]   Location [id number] [place] [address] Attending Melia Hopkins MD   Hosp Day # 0 PCP Joe Akins MD     Date of Admis HCl (ZOFRAN) injection 4 mg, 4 mg, Intravenous, Q6H PRN  metoprolol tartrate (LOPRESSOR) partial tablet 12.5 mg, 12.5 mg, Oral, BID  0.9% NaCl infusion, , Intravenous, Continuous  acetaminophen (TYLENOL) tab 650 mg, 650 mg, Oral, Q6H PRN  Pantoprazole [place] 06/06/21  0641   ALT 27  --  21   AST 19  --  20   ALKPHO 50*  --  36*   BILT 0.5  --  0.5   HGB 11.7* 9.9* 8.7*   WBC 15.1*  --  12.4*       Imaging:  XR ABDOMEN (1 VIEW) (CPT=74018)    Result Date: 6/5/2021  CONCLUSION:  1. No acute findings.   2. Calcifi

## 2021-06-06 NOTE — ANESTHESIA PREPROCEDURE EVALUATION
PRE-OP EVALUATION    Patient Name: Jammie Coleman    Admit Diagnosis: Weakness generalized [R53.1]  UGIB (upper gastrointestinal bleed) [K92.2]    Pre-op Diagnosis: Melena [K92.1]    ESOPHAGOGASTRODUODENOSCOPY (EGD)    Anesthesia Procedure: Cris Mandujano EC, Take 81 mg by mouth daily. , Disp: , Rfl: , 6/5/2021 at Unknown time  PREVIDENT 5000 BOOSTER PLUS 1.1 % Dental Paste, , Disp: , Rfl:         Allergies: Ibuprofen      Anesthesia Evaluation    Patient summary reviewed.     Anesthetic Complications  (-) hi normal.  Breath sounds clear to auscultation bilaterally. Other findings            ASA: 3 and emergent  Plan: MAC  NPO status verified and patient meets guidelines. Comment: Plan for MAC with GA back up.  Risks include but limited to a

## 2021-06-06 NOTE — ANESTHESIA POSTPROCEDURE EVALUATION
1 [facility] Patient Status:  Observation   Age/Gender 79year old female MRN [id number]   Location [id number] [place] [address] Attending Bev Perez MD   [facility] Day # 0 PCP Estiven Copeland MD       Anesthesia Post-op Not

## 2021-06-06 NOTE — H&P
[facility]                                                               History & Physical         Alma Najera Patient Status:  Emergency    1940 MRN [id number]   Location [address] [facility] Attending Taurus Gonzalez RN and will transfuse as needed    History:  Past Medical History:   Diagnosis Date   • Atrial fibrillation ([facility] [address]    • Coronary artery disease    • Deafness in left ear    • Hearing loss, left    • History of open heart surgery    • Other and unspecified h Results   Component Value Date    WBC 15.1 06/05/2021    HGB 11.7 06/05/2021    HCT 35.0 06/05/2021    .0 06/05/2021    CREATSERUM 1.05 06/05/2021    BUN 57 06/05/2021     06/05/2021    K 4.1 06/05/2021     06/05/2021    CO2 27.0 06/05/2 effusions.  No pneumothorax.  Median sternotomy approximated by wire sutures.  Atheromatous calcifications of the aorta.                            Impression   CONCLUSION:  No acute pulmonary findings.               Dictated by (CST): MD rose Miller

## 2021-06-06 NOTE — ED PROVIDER NOTES
Patient Seen in: [facility] Emergency Department      History   Patient presents with:  GI Bleeding    Stated Complaint: since 6 am black stools, weakness    HPI/Subjective:   HPI    Patient with history of coronary artery disease, bypass surgery in 87   Temp 97.4 °F (36.3 °C) (Temporal)   Resp 18   Ht 167.6 cm (5' 6\")   Wt 79.4 kg   SpO2 97%   BMI 28.25 kg/m²         Physical Exam    General: Well-developed, well-nourished, elderly, pale, tired appearing, no other acute distress  Head and neck: Norm order CBC With Differential With Platelet.   Procedure                               Abnormality         Status                     ---------                               -----------         ------                     CBC W/ DIFFERENTIAL[[id number]] today, with left shift. This will need to be monitored. We do not have a urinalysis yet. There is no sign of pneumonia on chest x-ray. THE [facility] hospitalist notified. Dr. Issa Goss from [address] [place] [address] contacted.     .  Admission disposition: 6/5/2021  7:42 PM

## 2021-06-06 NOTE — CONSULTS
[facility]  Cardiology Consultation    Susan Severo Patient Status:  Observation    1940 MRN [id number]   [facility]-A Attending Qasim Clark MD   Hosp Day # 0 PCP Janet Love MD     Reason for Consultation:  Hx of 0.9 % 10 mL IV push, 40 mg, Intravenous, Q12H    Review of Systems:  A comprehensive review of systems was negative if not otherwise mention in above HPI.     /44 (BP Location: Left arm)   Pulse 71   Temp 98.3 °F (36.8 °C) (Oral)   Resp 14   Ht 66\" unremarkable last march    Impression:  Principal Problem:    UGIB (upper gastrointestinal bleed)  Active Problems:    Hyperlipidemia    Essential hypertension    Coronary artery disease    PAF (paroxysmal atrial fibrillation) (HCC)    Weakness generalized

## 2021-06-07 PROCEDURE — 99214 OFFICE O/P EST MOD 30 MIN: CPT | Performed by: INTERNAL MEDICINE

## 2021-06-07 PROCEDURE — 99225 SUBSEQUENT OBSERVATION CARE: CPT | Performed by: INTERNAL MEDICINE

## 2021-06-07 RX ORDER — PANTOPRAZOLE SODIUM 40 MG/1
40 TABLET, DELAYED RELEASE ORAL
Qty: 60 TABLET | Refills: 1 | Status: SHIPPED | OUTPATIENT
Start: 2021-06-07 | End: 2021-06-23

## 2021-06-07 RX ORDER — VANCOMYCIN HYDROCHLORIDE 125 MG/1
125 CAPSULE ORAL EVERY 6 HOURS
Status: DISCONTINUED | OUTPATIENT
Start: 2021-06-07 | End: 2021-06-08

## 2021-06-07 NOTE — PLAN OF CARE
Upon reassessment, VSS, afebrile. Pt continues to deny pain. Pt is passing flatus and smear of black/tarry residual when wiping, otherwise, no further BM's post-procedure. Pt tolerating clear liquids. Dr. Costa Salvage notified of earlier episode of SVT.  Per T

## 2021-06-07 NOTE — PLAN OF CARE
Writing RN received call from Lab notifying of positive C-diff result. Dr. Venessa Krabbe (GI) notified and oral Vanco ordered. Dr. Rico Riding at bedside and aware. Pt given verbal and written education of C-diff. Pt is upset and anxious regarding new dx.  Emotio

## 2021-06-07 NOTE — PROGRESS NOTES
[facility] HOSPITALIST  Progress Note     Dolphus Plater Patient Status:  Observation    1940 MRN [id number]   Location [id number] [place] [address] Attending Kiki Price MD   Hosp Day # 0 PCP Nehemiah Mccoy MD     Chief Complaint: GI b TP 7.0 5.9*  --        Estimated Creatinine Clearance: 46.1 mL/min (based on SCr of 0.88 mg/dL).     Recent Labs   Lab 06/05/21  1901 06/06/21  0641   PTP 16.2* 15.5*   INR 1.26* 1.19*            COVID-19 Lab Results    COVID-19  Lab Results   Component no  Estimated date of discharge: tbd  Discharge is dependent on: progress  At this point Ms. Castle is expected to be discharge to: home    Plan of care discussed with patient, Mele Bryson MD

## 2021-06-07 NOTE — PLAN OF CARE
Patient A&Ox4, VSS, IVF continued. Tolerating CLD. Up to bedside commode with SBA. Voiding, no bloody stools this shift remains on contact isolation to r/o cdiff. Night uneventful. Concerns addressed, none further at this time.    Problem: Patient/Family Go precautions as indicated by assessment.  - Educate pt/family on patient safety including physical limitations  - Instruct pt to call for assistance with activity based on assessment  - Modify environment to reduce risk of injury  - Provide assistive device and tolerated  - Evaluate effectiveness of GI medications  - Encourage mobilization and activity  - Obtain nutritional consult as needed  - Establish a toileting routine/schedule  - Consider collaborating with pharmacy to review patient's medication profil

## 2021-06-07 NOTE — PROGRESS NOTES
[facility]  Cardiology Progress Note    Subjective:  No chest pain or shortness of breath. Frustrated about C.diff infection.   Tele reviewed     Objective:  /51 (BP Location: Left arm)   Pulse 93   Temp 97.6 °F (36.4 °C) (Oral)   Resp 18   Ht 5

## 2021-06-07 NOTE — PLAN OF CARE
Upon assessment, VSS, afebrile. Pt denies pain. Pt denies dizziness/lightheadedness. Pt is fatigued/weak. Pt denies chest pain/SOB. 's, NSR-ST on tele monitor. O2 sats stable on room air. CPOX on. Abdomen is soft, nondistended and nontender.  Bowel so - ADULT  Goal: Absence of fever/infection during anticipated neutropenic period  Description: INTERVENTIONS  - Monitor WBC  - Administer growth factors as ordered  - Implement neutropenic guidelines  Outcome: Progressing     Problem: SAFETY ADULT - FALL  G tube to low intermittent suction as ordered  - Evaluate effectiveness of ordered antiemetic medications  - Provide nonpharmacologic comfort measures as appropriate  - Advance diet as tolerated, if ordered  - Obtain nutritional consult as needed  - Evaluate

## 2021-06-07 NOTE — PLAN OF CARE
Upon assessment, VSS, afebrile. Pt is sitting in chair in good spirits. Pt denies pain. Pt reports continued fatigued/weakness, however, improved since yesterday. PT consult placed. Abdomen is soft, nondistended and nontender. Bowel sounds present x4.  Pt t INTERVENTIONS  - Monitor WBC  - Administer growth factors as ordered  - Implement neutropenic guidelines  Outcome: Progressing     Problem: SAFETY ADULT - FALL  Goal: Free from fall injury  Description: INTERVENTIONS:  - Assess pt frequently for physical n medications  - Provide nonpharmacologic comfort measures as appropriate  - Advance diet as tolerated, if ordered  - Obtain nutritional consult as needed  - Evaluate fluid balance  Outcome: Progressing  Goal: Maintains or returns to baseline bowel function

## 2021-06-07 NOTE — DISCHARGE SUMMARY
[facility] HOSPITALIST  DISCHARGE SUMMARY     Eleanor Kirby Patient Status:  Observation    1940 MRN [id number]   [facility] 3NW-A Attending Kamini Rios MD   Hosp Day # 0 PCP Vikram Gonzalez MD     Date of Admission: 2021  Date hemoglobin every 8 hours as discussed with RN and will transfuse as needed    Brief Synopsis: Pt was admitted and evaluated by GI. She went for EGD and was started on PPI. She will continue PPI for 8 weeks and plan for repeat EGD in 4 weeks.  Pt was also fo Besylate 2.5 MG Tabs  Commonly known as: NORVASC              Where to Get Your Medications      These medications were sent to [facility]/pharmacy #[id number]- [place], [place] - [address] [facility] [phone], [address] [address]

## 2021-06-07 NOTE — PLAN OF CARE
Pt returned from Endoscopy @1025. VSS, B/P 122/44, afebrile. Pt hypotensive in recovery. Dr. Avery Grant on unit and notified. 500cc bolus ordered. Norvasc and Lopressor d/c'd. Awaiting post-procedure orders for diet. Will continue to monitor.

## 2021-06-07 NOTE — PROGRESS NOTES
[facility] Gastroenterology Progress Note    CC: melena    S: no melena, hematochezia overnight     O: /69 (BP Location: Right arm)   Pulse 88   Temp 97.2 °F (36.2 °C) (Oral)   Resp 16   Ht 5' 6\" (1.676 m)   Wt 175 lb (79.4 kg)   SpO2 95%   BMI

## 2021-06-08 VITALS
WEIGHT: 175 LBS | TEMPERATURE: 98 F | DIASTOLIC BLOOD PRESSURE: 49 MMHG | BODY MASS INDEX: 28.13 KG/M2 | HEART RATE: 67 BPM | SYSTOLIC BLOOD PRESSURE: 135 MMHG | OXYGEN SATURATION: 96 % | HEIGHT: 66 IN | RESPIRATION RATE: 18 BRPM

## 2021-06-08 PROCEDURE — 99217 OBSERVATION CARE DISCHARGE: CPT | Performed by: INTERNAL MEDICINE

## 2021-06-08 PROCEDURE — 99214 OFFICE O/P EST MOD 30 MIN: CPT | Performed by: NURSE PRACTITIONER

## 2021-06-08 RX ORDER — PANTOPRAZOLE SODIUM 40 MG/1
40 TABLET, DELAYED RELEASE ORAL
Status: DISCONTINUED | OUTPATIENT
Start: 2021-06-08 | End: 2021-06-08

## 2021-06-08 RX ORDER — APIXABAN 5 MG/1
5 TABLET, FILM COATED ORAL 2 TIMES DAILY
Refills: 0 | Status: SHIPPED | COMMUNITY
Start: 2021-06-12

## 2021-06-08 RX ORDER — APIXABAN 5 MG/1
5 TABLET, FILM COATED ORAL 2 TIMES DAILY
Refills: 0 | Status: SHIPPED | COMMUNITY
Start: 2021-06-15 | End: 2021-06-08

## 2021-06-08 RX ORDER — VANCOMYCIN HYDROCHLORIDE 125 MG/1
125 CAPSULE ORAL EVERY 6 HOURS
Qty: 48 CAPSULE | Refills: 0 | Status: SHIPPED | OUTPATIENT
Start: 2021-06-08 | End: 2021-06-20

## 2021-06-08 NOTE — PLAN OF CARE
CARE PLAN ONLY:     Problem: Patient/Family Goals  Goal: Patient/Family Long Term Goal  Description: Patient's Long Term Goal: Discharge    Interventions:  - ADAT, Stable Hgb  - See additional Care Plan goals for specific interventions  6/8/2021 6841 by [facility] injury  Description: INTERVENTIONS:  - Assess pt frequently for physical needs  - Identify cognitive and physical deficits and behaviors that affect risk of falls.   - [place] fall precautions as indicated by assessment.  - Educate pt/family on patient sa ordered antiemetic medications  - Provide nonpharmacologic comfort measures as appropriate  - Advance diet as tolerated, if ordered  - Obtain nutritional consult as needed  - Evaluate fluid balance  6/8/2021 0451 by Lynda Huang RN  Outcome: Progressi

## 2021-06-08 NOTE — PROGRESS NOTES
[facility]  Cardiology Progress Note    Subjective:  No chest pain or shortness of breath.     Objective:  /49 (BP Location: Left arm)   Pulse 67   Temp 98 °F (36.7 °C) (Oral)   Resp 18   Ht 5' 6\" (1.676 m)   Wt 175 lb (79.4 kg)   SpO2 96%   BMI

## 2021-06-08 NOTE — PHYSICAL THERAPY NOTE
Orders received and when talked to RN, she stated she cancelled PT. Pt is refusing and talked to pt and she states she has no worries about returning home at her baseline. Will DC PT at this time.

## 2021-06-08 NOTE — PROGRESS NOTES
[facility] Pharmacy Note: Route Optimization for Pantoprazole (PROTONIX)    Patient is currently on Pantoprazole (PROTONIX) 40 mg IV every 12 hours.    The patient meets the criteria to convert to the oral equivalent as established by the IV to Oral conversion pro

## 2021-06-08 NOTE — PROGRESS NOTES
NURSING DISCHARGE NOTE    Discharged Home via Wheelchair. Accompanied by Support staff  Belongings Taken by patient/family     VSS afebrile. IV removed with catheter tip noted intact and without complication.   Discharge instructions and prescription

## 2021-06-08 NOTE — PROGRESS NOTES
[facility] HOSPITALIST  Progress Note     Brittnee Bearyassine Patient Status:  Observation    1940 MRN [id number]   Location [id number] [place] [address] Attending Mariana Alonzo MD   Hosp Day # 0 PCP Benedetta Litten, MD     Chief Complaint: GI b 104 110  --    CO2 27.0 25.0  --    ALKPHO 50* 36*  --    AST 19 20  --    ALT 27 21  --    BILT 0.5 0.5  --    TP 7.0 5.9*  --        Estimated Creatinine Clearance: 46.1 mL/min (based on SCr of 0.88 mg/dL).     Recent Labs   Lab 06/05/21  1901 06/06/21  0 no  Estimated date of discharge: today  Discharge is dependent on: progress  At this point Ms. Castle is expected to be discharge to: home    Plan of care discussed with patient, Nohemy Cain MD

## 2021-06-08 NOTE — PROGRESS NOTES
Upon assessment, VSS, aoX4. Pt denies pain and denies discomforts. Pt denies CP/Lightheadedness/PHANI/SOB; pt on RA. Tele on, pulse ox on continuously. Pt reports she does feel mildly fatigued; PT/OT to see pt.  Abdomen bowel sounds present x4; soft, nondiste

## 2021-06-08 NOTE — PROGRESS NOTES
[facility] Gastroenterology Progress Note    CC: melena    S: no melena, hematochezia overnight     O: /39 (BP Location: Right arm)   Pulse 80   Temp 98.6 °F (37 °C) (Oral)   Resp 18   Ht 5' 6\" (1.676 m)   Wt 175 lb (79.4 kg)   SpO2 96%   BMI 2

## 2021-06-09 ENCOUNTER — PATIENT OUTREACH (OUTPATIENT)
Dept: CASE MANAGEMENT | Age: 81
End: 2021-06-09

## 2021-06-09 ENCOUNTER — TELEPHONE (OUTPATIENT)
Dept: INTERNAL MEDICINE CLINIC | Facility: CLINIC | Age: 81
End: 2021-06-09

## 2021-06-09 DIAGNOSIS — Z02.9 ENCOUNTERS FOR UNSPECIFIED ADMINISTRATIVE PURPOSE: ICD-10-CM

## 2021-06-09 DIAGNOSIS — K92.2 UGIB (UPPER GASTROINTESTINAL BLEED): ICD-10-CM

## 2021-06-09 PROCEDURE — 1111F DSCHRG MED/CURRENT MED MERGE: CPT

## 2021-06-09 NOTE — PROGRESS NOTES
Initial Post Discharge Follow Up   Discharge Date: 6/8/21  Contact Date: 6/9/2021    Consent Verification:  Assessment Completed With: Patient  HIPAA Verified?   Yes    Discharge Dx:  UGIB (upper gastrointestinal bleed)    Was TCC ordered: no    General: 20 mg by mouth daily. • PREVIDENT 5000 BOOSTER PLUS 1.1 % Dental Paste      • Valsartan-hydroCHLOROthiazide 160-12.5 MG Oral Tab Take 1 tablet by mouth daily. • metoprolol Tartrate 25 MG Oral Tab Take 12.5 mg by mouth 2 (two) times daily. appear black. Patient was educated on symptoms of upper GI bleeds and if she should experience worsening symptoms or develop SOB, chest pain, fever/chills, or any new concerns, she is to return to the ER or call 911.   Patient understands and denies Nadya Key

## 2021-06-09 NOTE — PROGRESS NOTES
LM for pt to call [facility] for TCM since discharge. [facility] phone number was provided for pt to call back.

## 2021-06-09 NOTE — TELEPHONE ENCOUNTER
Pt discharged 6-8-21, upper GI bleed. Pt does not have HFU appt scheduled at this time. NCM attempted to schedule HFU with PCP however schedule is booked. TCM/HFU appt recommended by 6-22-21 as pt is a moderate risk for readmission.   Please discuss wi

## 2021-06-10 ENCOUNTER — TELEPHONE (OUTPATIENT)
Dept: CARDIOLOGY | Age: 81
End: 2021-06-10

## 2021-06-10 NOTE — TELEPHONE ENCOUNTER
appt scheduled     Future Appointments   Date Time Provider Tash Templetoni   6/11/2021  9:00 AM Lei Tesfaye MD [facility] [place]

## 2021-06-11 ENCOUNTER — OFFICE VISIT (OUTPATIENT)
Dept: INTERNAL MEDICINE CLINIC | Facility: CLINIC | Age: 81
End: 2021-06-11
Payer: COMMERCIAL

## 2021-06-11 VITALS
DIASTOLIC BLOOD PRESSURE: 60 MMHG | HEART RATE: 90 BPM | WEIGHT: 182.63 LBS | BODY MASS INDEX: 29.35 KG/M2 | RESPIRATION RATE: 16 BRPM | SYSTOLIC BLOOD PRESSURE: 140 MMHG | OXYGEN SATURATION: 99 % | HEIGHT: 66 IN | TEMPERATURE: 98 F

## 2021-06-11 DIAGNOSIS — D69.6 THROMBOCYTOPENIA (HCC): ICD-10-CM

## 2021-06-11 DIAGNOSIS — D62 ACUTE BLOOD LOSS ANEMIA: ICD-10-CM

## 2021-06-11 DIAGNOSIS — A04.72 C. DIFFICILE DIARRHEA: Primary | ICD-10-CM

## 2021-06-11 DIAGNOSIS — K92.2 UGIB (UPPER GASTROINTESTINAL BLEED): ICD-10-CM

## 2021-06-11 DIAGNOSIS — I10 ESSENTIAL HYPERTENSION: ICD-10-CM

## 2021-06-11 PROCEDURE — 99496 TRANSJ CARE MGMT HIGH F2F 7D: CPT | Performed by: INTERNAL MEDICINE

## 2021-06-11 PROCEDURE — 3008F BODY MASS INDEX DOCD: CPT | Performed by: INTERNAL MEDICINE

## 2021-06-11 PROCEDURE — 1111F DSCHRG MED/CURRENT MED MERGE: CPT | Performed by: INTERNAL MEDICINE

## 2021-06-11 PROCEDURE — 3078F DIAST BP <80 MM HG: CPT | Performed by: INTERNAL MEDICINE

## 2021-06-11 PROCEDURE — 3077F SYST BP >= 140 MM HG: CPT | Performed by: INTERNAL MEDICINE

## 2021-06-11 NOTE — PROGRESS NOTES
David Gurrola is a 79year old female. HPI:   Patient presents for hospital follow-up. 1. She presented to [facility] on 6/5 with melena and diarrhea. Was found to have GIB and EGD with shallow gastric ulcers and C diff positive.  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 Moderate stenosis 50-69% in left proximcal ICA  # HTN: doing well on metoprolol and valsartan/hctz. Hospital discontinued norvasc.  Co

## 2021-06-11 NOTE — PATIENT INSTRUCTIONS
Please complete your labs (you do NOT need to fast) a few days prior to your appointment with Dr. Tank Medrano on July 7th.

## 2021-06-14 ENCOUNTER — TELEPHONE (OUTPATIENT)
Dept: INTERNAL MEDICINE CLINIC | Facility: CLINIC | Age: 81
End: 2021-06-14

## 2021-06-14 NOTE — TELEPHONE ENCOUNTER
: Genevieve Stanley MD (Physician)        I confirmed with cardiology that patient should have started ASA on day of hospital discharge and could have resumed eliquis on 6/12. Please confirm patient has resumed al her anti-coagulation.

## 2021-06-14 NOTE — PROGRESS NOTES
I confirmed with cardiology that patient should have started ASA on day of hospital discharge and could have resumed eliquis on 6/12. Please confirm patient has resumed al her anti-coagulation.

## 2021-06-14 NOTE — TELEPHONE ENCOUNTER
Pt was dx with c diff in the hospital and is unfamiliar with what meds she should be taking and when she can discontinue \"isolation\".      I explained that for the c diff she is to finish her course of vanco and is to take the pantoprazole for upper GI bl

## 2021-06-14 NOTE — TELEPHONE ENCOUNTER
Patient requesting to speak with a nurse regarding her infection. Patient has a few questions regarding C-diff that she did not go over with the doctor when she was seen.  Patient would like to know the quarantine period as well as how long she needs to be

## 2021-06-23 ENCOUNTER — TELEPHONE (OUTPATIENT)
Dept: CARDIOLOGY | Age: 81
End: 2021-06-23

## 2021-06-23 PROBLEM — K27.9 PEPTIC ULCER DISEASE: Status: ACTIVE | Noted: 2021-06-23

## 2021-07-07 ENCOUNTER — APPOINTMENT (OUTPATIENT)
Dept: CARDIOLOGY | Age: 81
End: 2021-07-07

## 2021-07-09 ENCOUNTER — TELEPHONE (OUTPATIENT)
Dept: INTERNAL MEDICINE CLINIC | Facility: CLINIC | Age: 81
End: 2021-07-09

## 2021-07-09 ENCOUNTER — HOSPITAL ENCOUNTER (OUTPATIENT)
Dept: LAB | Facility: HOSPITAL | Age: 81
Discharge: HOME | End: 2021-07-09
Attending: INTERNAL MEDICINE
Payer: MEDICARE

## 2021-07-09 LAB
BASOPHILS # BLD AUTO: 0.11 X10(3) UL (ref 0–0.2)
BASOPHILS NFR BLD AUTO: 1.1 %
DEPRECATED HBV CORE AB SER IA-ACNC: 104.8 NG/ML
DEPRECATED RDW RBC AUTO: 51.7 FL (ref 35.1–46.3)
EOSINOPHIL # BLD AUTO: 0.31 X10(3) UL (ref 0–0.7)
EOSINOPHIL NFR BLD AUTO: 3.1 %
ERYTHROCYTE [DISTWIDTH] IN BLOOD BY AUTOMATED COUNT: 15.2 % (ref 11–15)
HCT VFR BLD AUTO: 36.5 %
HGB BLD-MCNC: 11.7 G/DL
IMM GRANULOCYTES # BLD AUTO: 0.03 X10(3) UL (ref 0–1)
IMM GRANULOCYTES NFR BLD: 0.3 %
LYMPHOCYTES # BLD AUTO: 3.06 X10(3) UL (ref 1–4)
LYMPHOCYTES NFR BLD AUTO: 30.2 %
MCH RBC QN AUTO: 29.8 PG (ref 26–34)
MCHC RBC AUTO-ENTMCNC: 32.1 G/DL (ref 31–37)
MCV RBC AUTO: 93.1 FL
MONOCYTES # BLD AUTO: 0.82 X10(3) UL (ref 0.1–1)
MONOCYTES NFR BLD AUTO: 8.1 %
NEUTROPHILS # BLD AUTO: 5.79 X10 (3) UL (ref 1.5–7.7)
NEUTROPHILS # BLD AUTO: 5.79 X10(3) UL (ref 1.5–7.7)
NEUTROPHILS NFR BLD AUTO: 57.2 %
PLATELET # BLD AUTO: 243 10(3)UL (ref 150–450)
RBC # BLD AUTO: 3.92 X10(6)UL
WBC # BLD AUTO: 10.1 X10(3) UL (ref 4–11)

## 2021-07-09 PROCEDURE — 82728 ASSAY OF FERRITIN: CPT | Performed by: INTERNAL MEDICINE

## 2021-07-09 PROCEDURE — 85025 COMPLETE CBC W/AUTO DIFF WBC: CPT | Performed by: INTERNAL MEDICINE

## 2021-07-09 PROCEDURE — 36415 COLL VENOUS BLD VENIPUNCTURE: CPT | Performed by: INTERNAL MEDICINE

## 2021-07-09 NOTE — TELEPHONE ENCOUNTER
Patient called requesting to speak with the nurse regarding lab results.    Relayed the following per Dr. Radha Banda recommendation, but patient has additional questions    Dear Stuart Saldana for completing your labs.     Your hemoglobin has improved

## 2021-07-09 NOTE — TELEPHONE ENCOUNTER
Spoke with patient discussed Ferritin and CBC level. Patient verbalized understanding and agreeable to POC.

## 2021-07-15 ENCOUNTER — TELEPHONE (OUTPATIENT)
Dept: INTERNAL MEDICINE CLINIC | Facility: CLINIC | Age: 81
End: 2021-07-15

## 2021-07-15 NOTE — TELEPHONE ENCOUNTER
Pt relayed test results/recommendations listed below and verbalized understanding. Dear Leatha Barnett,     Thank-you for completing your labs.     Your hemoglobin has improved SIGNIFICANTLY.  Please continue the iron supplement.     Sincerely  Dr. Eri Dillon

## 2021-07-15 NOTE — TELEPHONE ENCOUNTER
Patient would like to speak with a nurse to discuss her recent lab results regarding her iron levels. She wants to know how much iron she should be taking. Please advise.

## 2021-07-15 NOTE — TELEPHONE ENCOUNTER
If patient has not be on iron supplement then there is no need for her to start an iron supplement since her bleeding has stopped.

## 2021-07-15 NOTE — TELEPHONE ENCOUNTER
Dr Jasiel Parra, I can not find records where pt was instructed to start iron. Please advise. Pt is responding to lab results from 7/9/21. Dear Hank Bertrand,     Thank-you for completing your labs.     Your hemoglobin has improved SIGNIFICANTLY.  Please contin

## 2021-07-21 RX ORDER — SODIUM CHLORIDE, SODIUM LACTATE, POTASSIUM CHLORIDE, CALCIUM CHLORIDE 600; 310; 30; 20 MG/100ML; MG/100ML; MG/100ML; MG/100ML
INJECTION, SOLUTION INTRAVENOUS CONTINUOUS
Status: CANCELLED | OUTPATIENT
Start: 2021-07-21

## 2021-08-02 ENCOUNTER — TELEPHONE (OUTPATIENT)
Dept: INTERNAL MEDICINE CLINIC | Facility: CLINIC | Age: 81
End: 2021-08-02

## 2021-08-02 ENCOUNTER — ANESTHESIA (OUTPATIENT)
Dept: ENDOSCOPY | Facility: HOSPITAL | Age: 81
End: 2021-08-02
Payer: MEDICARE

## 2021-08-02 ENCOUNTER — ANESTHESIA EVENT (OUTPATIENT)
Dept: ENDOSCOPY | Facility: HOSPITAL | Age: 81
End: 2021-08-02
Payer: MEDICARE

## 2021-08-02 ENCOUNTER — HOSPITAL ENCOUNTER (OUTPATIENT)
Facility: HOSPITAL | Age: 81
Setting detail: HOSPITAL OUTPATIENT SURGERY
Discharge: HOME | End: 2021-08-02
Attending: INTERNAL MEDICINE | Admitting: INTERNAL MEDICINE
Payer: MEDICARE

## 2021-08-02 VITALS
WEIGHT: 175 LBS | HEIGHT: 65 IN | RESPIRATION RATE: 20 BRPM | OXYGEN SATURATION: 95 % | BODY MASS INDEX: 29.16 KG/M2 | HEART RATE: 72 BPM | DIASTOLIC BLOOD PRESSURE: 61 MMHG | TEMPERATURE: 98 F | SYSTOLIC BLOOD PRESSURE: 128 MMHG

## 2021-08-02 DIAGNOSIS — K27.9 PEPTIC ULCER DISEASE: ICD-10-CM

## 2021-08-02 PROCEDURE — 0DB68ZX EXCISION OF STOMACH, VIA NATURAL OR ARTIFICIAL OPENING ENDOSCOPIC, DIAGNOSTIC: ICD-10-PCS | Performed by: INTERNAL MEDICINE

## 2021-08-02 PROCEDURE — 88305 TISSUE EXAM BY PATHOLOGIST: CPT | Performed by: INTERNAL MEDICINE

## 2021-08-02 PROCEDURE — 0DB78ZX EXCISION OF STOMACH, PYLORUS, VIA NATURAL OR ARTIFICIAL OPENING ENDOSCOPIC, DIAGNOSTIC: ICD-10-PCS | Performed by: INTERNAL MEDICINE

## 2021-08-02 RX ORDER — NALOXONE HYDROCHLORIDE 0.4 MG/ML
80 INJECTION, SOLUTION INTRAMUSCULAR; INTRAVENOUS; SUBCUTANEOUS AS NEEDED
Status: DISCONTINUED | OUTPATIENT
Start: 2021-08-02 | End: 2021-08-02

## 2021-08-02 RX ORDER — LIDOCAINE HYDROCHLORIDE 10 MG/ML
INJECTION, SOLUTION EPIDURAL; INFILTRATION; INTRACAUDAL; PERINEURAL AS NEEDED
Status: DISCONTINUED | OUTPATIENT
Start: 2021-08-02 | End: 2021-08-02 | Stop reason: SURG

## 2021-08-02 RX ORDER — SODIUM CHLORIDE, SODIUM LACTATE, POTASSIUM CHLORIDE, CALCIUM CHLORIDE 600; 310; 30; 20 MG/100ML; MG/100ML; MG/100ML; MG/100ML
INJECTION, SOLUTION INTRAVENOUS CONTINUOUS
Status: DISCONTINUED | OUTPATIENT
Start: 2021-08-02 | End: 2021-08-02

## 2021-08-02 RX ORDER — ONDANSETRON 2 MG/ML
4 INJECTION INTRAMUSCULAR; INTRAVENOUS AS NEEDED
Status: DISCONTINUED | OUTPATIENT
Start: 2021-08-02 | End: 2021-08-02

## 2021-08-02 RX ADMIN — LIDOCAINE HYDROCHLORIDE 30 MG: 10 INJECTION, SOLUTION EPIDURAL; INFILTRATION; INTRACAUDAL; PERINEURAL at 14:47:00

## 2021-08-02 RX ADMIN — SODIUM CHLORIDE, SODIUM LACTATE, POTASSIUM CHLORIDE, CALCIUM CHLORIDE: 600; 310; 30; 20 INJECTION, SOLUTION INTRAVENOUS at 15:00:00

## 2021-08-02 RX ADMIN — SODIUM CHLORIDE, SODIUM LACTATE, POTASSIUM CHLORIDE, CALCIUM CHLORIDE: 600; 310; 30; 20 INJECTION, SOLUTION INTRAVENOUS at 14:44:00

## 2021-08-02 NOTE — TELEPHONE ENCOUNTER
Received a page from the patient in regards to when she can restart Eliquis. She had EGD today and per Dr. Lorene Scanlon notes, okay to resume it in the morning. Patient will call Dr. Lorene Scanlon office with any further questions.     [facility] DO

## 2021-08-02 NOTE — ANESTHESIA POSTPROCEDURE EVALUATION
[facility] Patient Status:  Hospital Outpatient Surgery   Age/Gender 79year old female MRN [id number]   Location [id number] [place] [address] Attending Wiley Duggan MD   Hosp Day # 0 PCP MD Derrek Pickens

## 2021-08-02 NOTE — H&P
[facility] Patient Status:  Hospital Outpatient Surgery    1940 MRN [id number]   Location [id number] [place] [address] Attending Shaun Bowman MD   Hosp Day # 0 PCP Michelle Davis MD distention, jaundice, flatulence, vomiting blood, bloating, hernia, laxative use, food/milk intolerance, pain with bowel movement, hemorrhoids.   General: Denies fatigue, chills/fever, night sweats, weight loss, loss of appetite, weight gain, sleep disturba in mouth, hearing loss. Physical Exam:   General: alert, cooperative, oriented. No respiratory distress. Head: Normocephalic, without obvious abnormality, atraumatic. Eyes: Conjunctivae/corneas clear. No scleral icterus.   No conjunctival     hemor

## 2021-08-02 NOTE — OPERATIVE REPORT
Operative Report-Esophagogastroduodenoscopy      PREOPERATIVE DIAGNOSIS/INDICATION: PUD    POSTOPERTATIVE DIAGNOSIS: Gastritis     PROCEDURE PERFORMED: EGD    INFORMED CONSENT: Once a brief history and physical examination was perf

## 2021-08-02 NOTE — ANESTHESIA PREPROCEDURE EVALUATION
PRE-OP EVALUATION    Patient Name: Keira Chaudhry    Admit Diagnosis: Peptic ulcer disease [K27.9]    Pre-op Diagnosis: Peptic ulcer disease [K27.9]    ESOPHAGOGASTRODUODENOSCOPY (EGD)    Anesthesia Procedure: ESOPHAGOGASTRODUODENOSCOPY (EGD) (N/A ) Never Used    Alcohol use: No      Alcohol/week: 0.0 standard drinks      Drug use: No     Available pre-op labs reviewed.   Lab Results   Component Value Date    WBC 10.1 07/09/2021    RBC 3.92 07/09/2021    HGB 11.7 (L) 07/09/2021    HCT 36.5 07/09/2021

## 2021-08-05 ENCOUNTER — TELEPHONE (OUTPATIENT)
Dept: INTERNAL MEDICINE CLINIC | Facility: CLINIC | Age: 81
End: 2021-08-05

## 2021-08-05 NOTE — TELEPHONE ENCOUNTER
Patient stated that she received a message via [employer] for test results. Patient stated that she doesn't really use her [employer] and would like to speak with a nurse. I was unable to find anything in her chart.

## 2021-08-05 NOTE — TELEPHONE ENCOUNTER
LM on approved HIPAA number with test results below. Dear Cici Mercedes,     Thank-you for completing your labs.     Your hemoglobin has improved SIGNIFICANTLY.  Please continue the iron supplement.     Sincerely  Dr. Lashaun Foss

## 2021-09-17 ENCOUNTER — TELEPHONE (OUTPATIENT)
Dept: CARDIOLOGY | Age: 81
End: 2021-09-17

## 2021-11-09 ENCOUNTER — TELEPHONE (OUTPATIENT)
Dept: INTERNAL MEDICINE CLINIC | Facility: CLINIC | Age: 81
End: 2021-11-09

## 2021-11-09 DIAGNOSIS — I10 ESSENTIAL HYPERTENSION: Primary | ICD-10-CM

## 2021-11-09 DIAGNOSIS — E78.2 MIXED HYPERLIPIDEMIA: ICD-10-CM

## 2021-11-09 DIAGNOSIS — I25.10 CORONARY ARTERY DISEASE INVOLVING NATIVE CORONARY ARTERY OF NATIVE HEART WITHOUT ANGINA PECTORIS: ICD-10-CM

## 2021-11-09 DIAGNOSIS — D69.6 THROMBOCYTOPENIA (HCC): ICD-10-CM

## 2021-11-09 NOTE — TELEPHONE ENCOUNTER
Pt made an appointment to discuss extreme fatigue end of December, requesting to know if she can complete any blood work prior.      Please advise

## 2021-11-10 ENCOUNTER — HOSPITAL ENCOUNTER (OUTPATIENT)
Dept: LAB | Facility: HOSPITAL | Age: 81
Discharge: HOME | End: 2021-11-10
Attending: INTERNAL MEDICINE
Payer: MEDICARE

## 2021-11-10 ENCOUNTER — TELEPHONE (OUTPATIENT)
Dept: INTERNAL MEDICINE CLINIC | Facility: CLINIC | Age: 81
End: 2021-11-10

## 2021-11-10 DIAGNOSIS — I25.10 CORONARY ARTERY DISEASE INVOLVING NATIVE CORONARY ARTERY OF NATIVE HEART WITHOUT ANGINA PECTORIS: ICD-10-CM

## 2021-11-10 DIAGNOSIS — D69.6 THROMBOCYTOPENIA (HCC): ICD-10-CM

## 2021-11-10 DIAGNOSIS — E78.2 MIXED HYPERLIPIDEMIA: ICD-10-CM

## 2021-11-10 DIAGNOSIS — I10 ESSENTIAL HYPERTENSION: ICD-10-CM

## 2021-11-10 PROCEDURE — 85025 COMPLETE CBC W/AUTO DIFF WBC: CPT

## 2021-11-10 PROCEDURE — 82728 ASSAY OF FERRITIN: CPT

## 2021-11-10 PROCEDURE — 84460 ALANINE AMINO (ALT) (SGPT): CPT

## 2021-11-10 PROCEDURE — 84443 ASSAY THYROID STIM HORMONE: CPT

## 2021-11-10 PROCEDURE — 84439 ASSAY OF FREE THYROXINE: CPT

## 2021-11-10 PROCEDURE — 82306 VITAMIN D 25 HYDROXY: CPT

## 2021-11-10 PROCEDURE — 84450 TRANSFERASE (AST) (SGOT): CPT

## 2021-11-10 PROCEDURE — 36415 COLL VENOUS BLD VENIPUNCTURE: CPT

## 2021-11-10 PROCEDURE — 80048 BASIC METABOLIC PNL TOTAL CA: CPT

## 2021-11-10 NOTE — TELEPHONE ENCOUNTER
Pt is requesting a call from a nurse with her test results once it has been reviewed.  Please advise

## 2021-11-11 NOTE — TELEPHONE ENCOUNTER
Pt called for an update, results have been reviewed and pt would like a call back from a nurse to go over them.  Please advise

## 2021-11-11 NOTE — TELEPHONE ENCOUNTER
Results from 11/10/21 documented by KS listed below reviewed with pt. Pt verbalized understanding of test results and recommendations.      Pt states that she \"has had a rough year\" and does not feel like she is back to her normal self with energy or

## 2021-12-20 ENCOUNTER — OFFICE VISIT (OUTPATIENT)
Dept: INTERNAL MEDICINE CLINIC | Facility: CLINIC | Age: 81
End: 2021-12-20
Payer: COMMERCIAL

## 2021-12-20 VITALS
HEART RATE: 73 BPM | BODY MASS INDEX: 29.92 KG/M2 | OXYGEN SATURATION: 96 % | TEMPERATURE: 98 F | DIASTOLIC BLOOD PRESSURE: 80 MMHG | SYSTOLIC BLOOD PRESSURE: 160 MMHG | RESPIRATION RATE: 16 BRPM | WEIGHT: 177.38 LBS | HEIGHT: 64.5 IN

## 2021-12-20 DIAGNOSIS — I48.0 PAF (PAROXYSMAL ATRIAL FIBRILLATION) (HCC): ICD-10-CM

## 2021-12-20 DIAGNOSIS — K27.9 PEPTIC ULCER DISEASE: ICD-10-CM

## 2021-12-20 DIAGNOSIS — E78.2 MIXED HYPERLIPIDEMIA: ICD-10-CM

## 2021-12-20 DIAGNOSIS — E66.3 OVERWEIGHT (BMI 25.0-29.9): ICD-10-CM

## 2021-12-20 DIAGNOSIS — I25.10 CORONARY ARTERY DISEASE INVOLVING NATIVE CORONARY ARTERY OF NATIVE HEART WITHOUT ANGINA PECTORIS: ICD-10-CM

## 2021-12-20 DIAGNOSIS — I10 ESSENTIAL HYPERTENSION: Primary | ICD-10-CM

## 2021-12-20 PROBLEM — D62 ACUTE BLOOD LOSS ANEMIA: Status: RESOLVED | Noted: 2021-06-11 | Resolved: 2021-12-20

## 2021-12-20 PROBLEM — D69.6 THROMBOCYTOPENIA (HCC): Chronic | Status: RESOLVED | Noted: 2021-06-11 | Resolved: 2021-12-20

## 2021-12-20 PROBLEM — K92.2 UGIB (UPPER GASTROINTESTINAL BLEED): Status: RESOLVED | Noted: 2021-06-05 | Resolved: 2021-12-20

## 2021-12-20 PROBLEM — E66.09 CLASS 1 OBESITY DUE TO EXCESS CALORIES WITH SERIOUS COMORBIDITY AND BODY MASS INDEX (BMI) OF 30.0 TO 30.9 IN ADULT: Status: RESOLVED | Noted: 2021-02-01 | Resolved: 2021-12-20

## 2021-12-20 PROCEDURE — 3077F SYST BP >= 140 MM HG: CPT | Performed by: INTERNAL MEDICINE

## 2021-12-20 PROCEDURE — 3079F DIAST BP 80-89 MM HG: CPT | Performed by: INTERNAL MEDICINE

## 2021-12-20 PROCEDURE — 3008F BODY MASS INDEX DOCD: CPT | Performed by: INTERNAL MEDICINE

## 2021-12-20 PROCEDURE — 99214 OFFICE O/P EST MOD 30 MIN: CPT | Performed by: INTERNAL MEDICINE

## 2021-12-20 RX ORDER — AMLODIPINE BESYLATE 2.5 MG/1
2.5 TABLET ORAL DAILY
COMMUNITY
Start: 2021-07-04 | End: 2021-12-20

## 2021-12-20 RX ORDER — AMLODIPINE BESYLATE 2.5 MG/1
2.5 TABLET ORAL DAILY
Qty: 90 TABLET | Refills: 1 | Status: SHIPPED | OUTPATIENT
Start: 2021-12-20

## 2021-12-20 RX ORDER — CHOLECALCIFEROL (VITAMIN D3) 125 MCG
4000 CAPSULE ORAL DAILY
COMMUNITY

## 2021-12-20 NOTE — PATIENT INSTRUCTIONS
For pain you can use the following things safely -  1. Please do not exceed 3000 mg (3 grams) of tylenol in 24 hours.    It is very important you look at the amount of tylenol (acetaminophen) in any of your over the counter medications to ensure you do not

## 2021-12-20 NOTE — PROGRESS NOTES
David Gurrola is a 79year old female. HPI:   Patient presents for the following issues. 1. Vitamin D deficiency - she has been taking supplement for 1 month. Her energy is better. 2. She has recovered from C Diff and UGIB - no longer anemic.   3 tobacco: Never Used    Vaping Use      Vaping Use: Never used    Alcohol use: No      Alcohol/week: 0.0 standard drinks    Drug use: No          EXAM:   /80 (BP Location: Left arm, Patient Position: Sitting)   Pulse 73   Temp 97.6 °F (36.4 °C) (Oral) Screening: normal mammogram in 5/2016, no longer wants screening. Bone Health: on calcium/vit D, already doing weight bearing exercises. Declines DEXA despite my advice. Vaccines: Shingles 2013, Pneumovac 2013. Prevnar 13 in 2016. TDAP in 2017.  [place]

## 2022-03-29 ENCOUNTER — TELEPHONE (OUTPATIENT)
Dept: INTERNAL MEDICINE CLINIC | Facility: CLINIC | Age: 82
End: 2022-03-29

## 2022-04-11 ENCOUNTER — TELEPHONE (OUTPATIENT)
Dept: INTERNAL MEDICINE CLINIC | Facility: CLINIC | Age: 82
End: 2022-04-11

## 2022-04-11 RX ORDER — AZITHROMYCIN 250 MG/1
TABLET, FILM COATED ORAL
Qty: 6 TABLET | Refills: 0 | Status: CANCELLED | OUTPATIENT
Start: 2022-04-11 | End: 2022-04-16

## 2022-04-11 NOTE — TELEPHONE ENCOUNTER
Pt called back and stated her test was negative. Pt declining VV or In office visit. Pt stated she wants an abx sent to pharmacy only. Please advise.

## 2022-04-11 NOTE — TELEPHONE ENCOUNTER
Called and informed pt that she needs to be seen prior to receiving abx. Pt agreed to schedule VV. Transferred call to front office.

## 2022-04-11 NOTE — TELEPHONE ENCOUNTER
Pt c/o     Cough  Sore throat  Fatigue/weakness   Onset 4 days    Has appt at 10am 4/11/22 for rapid covid test at [facility].  Patient instructed to call this office back with test results when available. OK to schedule VV sometime this week?  Provider schedule full at time of call

## 2022-04-11 NOTE — TELEPHONE ENCOUNTER
Future Appointments   Date Time Provider Tash Latoya   4/13/2022 10:30 AM Loy Davalos MD [facility] 8 [facility] [place]

## 2022-04-11 NOTE — TELEPHONE ENCOUNTER
Called pt, she had the COVID PCR test done at [facility], they will send to [employer], can take up to 2 days for results. Pt stated she has a home test that she will take to see if it comes back positive and call us back. Pt is requesting Z-Pack, she feels awful, very tired, mucous is yellow. Pt rarely takes medications. Has been trying to deal with this for 4 days with Mucinex and Robitussin. Encouraged extra water.

## 2022-05-04 ENCOUNTER — OFFICE VISIT (OUTPATIENT)
Dept: INTERNAL MEDICINE CLINIC | Facility: CLINIC | Age: 82
End: 2022-05-04
Payer: COMMERCIAL

## 2022-05-04 VITALS
SYSTOLIC BLOOD PRESSURE: 140 MMHG | WEIGHT: 176 LBS | HEART RATE: 77 BPM | OXYGEN SATURATION: 97 % | RESPIRATION RATE: 16 BRPM | HEIGHT: 64.5 IN | DIASTOLIC BLOOD PRESSURE: 70 MMHG | BODY MASS INDEX: 29.68 KG/M2 | TEMPERATURE: 98 F

## 2022-05-04 DIAGNOSIS — E66.3 OVERWEIGHT (BMI 25.0-29.9): ICD-10-CM

## 2022-05-04 DIAGNOSIS — Z00.00 ROUTINE PHYSICAL EXAMINATION: Primary | ICD-10-CM

## 2022-05-04 DIAGNOSIS — K27.9 PEPTIC ULCER DISEASE: ICD-10-CM

## 2022-05-04 DIAGNOSIS — K92.2 UGIB (UPPER GASTROINTESTINAL BLEED): ICD-10-CM

## 2022-05-04 DIAGNOSIS — M16.11 PRIMARY OSTEOARTHRITIS OF RIGHT HIP: ICD-10-CM

## 2022-05-04 DIAGNOSIS — M67.432 GANGLION CYST OF DORSUM OF LEFT WRIST: ICD-10-CM

## 2022-05-04 DIAGNOSIS — E78.2 MIXED HYPERLIPIDEMIA: ICD-10-CM

## 2022-05-04 DIAGNOSIS — I10 ESSENTIAL HYPERTENSION: ICD-10-CM

## 2022-05-04 DIAGNOSIS — Z78.0 POSTMENOPAUSAL: ICD-10-CM

## 2022-05-04 DIAGNOSIS — R05.9 COUGH: ICD-10-CM

## 2022-05-04 DIAGNOSIS — I25.10 CORONARY ARTERY DISEASE INVOLVING NATIVE CORONARY ARTERY OF NATIVE HEART WITHOUT ANGINA PECTORIS: ICD-10-CM

## 2022-05-04 DIAGNOSIS — I48.0 PAF (PAROXYSMAL ATRIAL FIBRILLATION) (HCC): ICD-10-CM

## 2022-05-04 PROBLEM — Z86.19 HISTORY OF CLOSTRIDIOIDES DIFFICILE COLITIS: Status: ACTIVE | Noted: 2022-05-04

## 2022-05-04 PROCEDURE — G0439 PPPS, SUBSEQ VISIT: HCPCS | Performed by: INTERNAL MEDICINE

## 2022-05-04 PROCEDURE — 3077F SYST BP >= 140 MM HG: CPT | Performed by: INTERNAL MEDICINE

## 2022-05-04 PROCEDURE — 99397 PER PM REEVAL EST PAT 65+ YR: CPT | Performed by: INTERNAL MEDICINE

## 2022-05-04 PROCEDURE — 96160 PT-FOCUSED HLTH RISK ASSMT: CPT | Performed by: INTERNAL MEDICINE

## 2022-05-04 PROCEDURE — 3008F BODY MASS INDEX DOCD: CPT | Performed by: INTERNAL MEDICINE

## 2022-05-04 PROCEDURE — 3078F DIAST BP <80 MM HG: CPT | Performed by: INTERNAL MEDICINE

## 2022-05-04 RX ORDER — GARLIC EXTRACT 500 MG
1 CAPSULE ORAL DAILY
COMMUNITY

## 2022-05-04 NOTE — PATIENT INSTRUCTIONS
I Have ordered a bone density for you    I have ordered blood tests and chest x ray.  No need to fast     You may schedule an appointment by one of the following:    - [email or web address]  - [facility]  - Calling [facility] Sched[facility] at [phone]

## 2022-05-09 ENCOUNTER — TELEPHONE (OUTPATIENT)
Dept: INTERNAL MEDICINE CLINIC | Facility: CLINIC | Age: 82
End: 2022-05-09

## 2022-05-09 NOTE — TELEPHONE ENCOUNTER
Pt request to fax lab and imaging orders.    Request faxed to [facility]-[phone]  NSJ-[phone]

## 2022-05-11 ENCOUNTER — IMAGING SERVICES (OUTPATIENT)
Dept: BONE DENSITY | Age: 82
End: 2022-05-11

## 2022-05-11 ENCOUNTER — IMAGING SERVICES (OUTPATIENT)
Dept: GENERAL RADIOLOGY | Age: 82
End: 2022-05-11

## 2022-05-11 DIAGNOSIS — R05.9 COUGH: ICD-10-CM

## 2022-05-11 DIAGNOSIS — Z78.0 POSTMENOPAUSAL: ICD-10-CM

## 2022-05-11 PROCEDURE — 71046 X-RAY EXAM CHEST 2 VIEWS: CPT | Performed by: RADIOLOGY

## 2022-05-11 PROCEDURE — 77080 DXA BONE DENSITY AXIAL: CPT | Performed by: RADIOLOGY

## 2022-05-12 ENCOUNTER — LAB SERVICES (OUTPATIENT)
Dept: LAB | Age: 82
End: 2022-05-12

## 2022-05-12 ENCOUNTER — TELEPHONE (OUTPATIENT)
Dept: INTERNAL MEDICINE CLINIC | Facility: CLINIC | Age: 82
End: 2022-05-12

## 2022-05-12 DIAGNOSIS — Z00.00 ENCOUNTER FOR GENERAL ADULT MEDICAL EXAMINATION WITHOUT ABNORMAL FINDINGS: Primary | ICD-10-CM

## 2022-05-12 LAB
ANION GAP SERPL CALC-SCNC: 13 MMOL/L (ref 10–20)
BASOPHILS # BLD: 0.2 K/MCL (ref 0–0.3)
BASOPHILS NFR BLD: 2 %
BUN SERPL-MCNC: 29 MG/DL (ref 6–20)
BUN/CREAT SERPL: 30 (ref 7–25)
CALCIUM SERPL-MCNC: 9.8 MG/DL (ref 8.4–10.2)
CHLORIDE SERPL-SCNC: 102 MMOL/L (ref 98–107)
CHOLEST SERPL-MCNC: 141 MG/DL
CHOLEST/HDLC SERPL: 2.4 {RATIO}
CO2 SERPL-SCNC: 27 MMOL/L (ref 21–32)
CREAT SERPL-MCNC: 0.96 MG/DL (ref 0.51–0.95)
DEPRECATED RDW RBC: 48.4 FL (ref 39–50)
EOSINOPHIL # BLD: 1 K/MCL (ref 0–0.5)
EOSINOPHIL NFR BLD: 9 %
ERYTHROCYTE [DISTWIDTH] IN BLOOD: 14.5 % (ref 11–15)
FASTING DURATION TIME PATIENT: 0 HOURS (ref 0–999)
FASTING DURATION TIME PATIENT: 0 HOURS (ref 0–999)
GFR SERPLBLD BASED ON 1.73 SQ M-ARVRAT: 59 ML/MIN
GLUCOSE SERPL-MCNC: 112 MG/DL (ref 70–99)
HCT VFR BLD CALC: 42.4 % (ref 36–46.5)
HDLC SERPL-MCNC: 59 MG/DL
HGB BLD-MCNC: 13.9 G/DL (ref 12–15.5)
IMM GRANULOCYTES # BLD AUTO: 0 K/MCL (ref 0–0.2)
IMM GRANULOCYTES # BLD: 0 %
LDLC SERPL CALC-MCNC: 61 MG/DL
LYMPHOCYTES # BLD: 2.5 K/MCL (ref 1–4)
LYMPHOCYTES NFR BLD: 24 %
MCH RBC QN AUTO: 30 PG (ref 26–34)
MCHC RBC AUTO-ENTMCNC: 32.8 G/DL (ref 32–36.5)
MCV RBC AUTO: 91.4 FL (ref 78–100)
MONOCYTES # BLD: 1 K/MCL (ref 0.3–0.9)
MONOCYTES NFR BLD: 9 %
NEUTROPHILS # BLD: 5.8 K/MCL (ref 1.8–7.7)
NEUTROPHILS NFR BLD: 56 %
NONHDLC SERPL-MCNC: 82 MG/DL
NRBC BLD MANUAL-RTO: 0 /100 WBC
PLATELET # BLD AUTO: 204 K/MCL (ref 140–450)
POTASSIUM SERPL-SCNC: 4.3 MMOL/L (ref 3.4–5.1)
RBC # BLD: 4.64 MIL/MCL (ref 4–5.2)
SODIUM SERPL-SCNC: 138 MMOL/L (ref 135–145)
TRIGL SERPL-MCNC: 105 MG/DL
WBC # BLD: 10.4 K/MCL (ref 4.2–11)

## 2022-05-12 PROCEDURE — 85025 COMPLETE CBC W/AUTO DIFF WBC: CPT | Performed by: INTERNAL MEDICINE

## 2022-05-12 PROCEDURE — 80061 LIPID PANEL: CPT | Performed by: INTERNAL MEDICINE

## 2022-05-12 PROCEDURE — 80048 BASIC METABOLIC PNL TOTAL CA: CPT | Performed by: INTERNAL MEDICINE

## 2022-05-12 PROCEDURE — 36415 COLL VENOUS BLD VENIPUNCTURE: CPT | Performed by: INTERNAL MEDICINE

## 2022-05-12 NOTE — TELEPHONE ENCOUNTER
[phone] and spoke to pt. She had the Dexa scan and x-ray yesterday at [facility] in [facility] which is just down the street from her home. Pt states it takes her 3 hours round-trip to go to [facility]. Pt had her blood tests done today, at the same place. Informed pt that it may be a few more days before we get the faxed results and Dr. Mando Johnson can review them, but we will get in touch with her soon. Pt verbalized understanding. Please watch for fax from [facility].

## 2022-05-12 NOTE — TELEPHONE ENCOUNTER
Pt calling for lab, x-ray, and dexa scan results. Informed patient that lab results are still pending and that Dr. Samina Her has to review the results in before we call her back. Pt would like a call once results are reviewed.

## 2022-05-13 NOTE — TELEPHONE ENCOUNTER
Incoming fax for bone density from 5/11/22 - [facility]. Impression:   The patient bone mineral density is considered osteopenic according to WHO criteria. The bone mineral density measured at femoral neck left is 0.583 g/cm2 with a T score of (-1.3). Fracture risk is moderate. Please see additional comments/observations as outlined in the report. Incoming fax for chest xray from 5/11/22. Impression:   No acute cardiopulmonary process. Previous open heart surgery.

## 2022-05-16 NOTE — TELEPHONE ENCOUNTER
Cira - you saw patient for MAS on 5/4/2022 so I am just keeping you in the loop. But I am happy to manage as I have known her for many years. She has high risk for hip fracture so really needs to be on a medication to prevent fractures.  Please ask patient to schedule virtual visit so I can discuss options with her  Her CXR is normal.

## 2022-06-03 NOTE — TELEPHONE ENCOUNTER
Patient is asking for bone density scan results she was supposed to see KS today but had to cancel appointment. No results are in our system. She said KS has them. Are you able to contact patient with results. Thank you!

## 2022-06-03 NOTE — TELEPHONE ENCOUNTER
I do have the results. She is high risk for hip fracture so needs to start medication. I need to discuss all the risks/benefits/indications for therapy. Thus she needs an office visit. However, it is not emergent. She can wait for first available opening.

## 2022-06-07 ENCOUNTER — TELEPHONE (OUTPATIENT)
Dept: INTERNAL MEDICINE CLINIC | Facility: CLINIC | Age: 82
End: 2022-06-07

## 2022-06-07 PROBLEM — M85.89 OSTEOPENIA OF MULTIPLE SITES: Status: ACTIVE | Noted: 2022-06-07

## 2022-06-07 NOTE — TELEPHONE ENCOUNTER
Please have patient schedule a video visit with me to discuss her bone density results  [address] DO

## 2022-06-07 NOTE — TELEPHONE ENCOUNTER
Informed pt, she now cancelled appt because she says it is a 3 hour drive and now does not want to come into the office. Pt stated she is only willing to do phone call.      ZENIA

## 2022-06-07 NOTE — TELEPHONE ENCOUNTER
Contacted pt and scheduled appt. Pt became irritated when asked to do VV. Pt wondering if she can do phone call visit and if not, she would like to come in the office.     Please advise     Future Appointments   Date Time Provider Tash Klein   6/10/2022 11:00 AM Fina Centeno, DO [facility] [place]

## 2022-06-08 NOTE — TELEPHONE ENCOUNTER
Future Appointments   Date Time Provider Tash Klein   6/15/2022  9:30 AM Benoit Cruz, DO [facility] [place]

## 2022-06-15 ENCOUNTER — VIRTUAL PHONE E/M (OUTPATIENT)
Dept: INTERNAL MEDICINE CLINIC | Facility: CLINIC | Age: 82
End: 2022-06-15
Payer: COMMERCIAL

## 2022-06-15 DIAGNOSIS — E78.2 MIXED HYPERLIPIDEMIA: ICD-10-CM

## 2022-06-15 DIAGNOSIS — M85.89 OSTEOPENIA OF MULTIPLE SITES: Primary | ICD-10-CM

## 2022-06-15 DIAGNOSIS — I25.10 CORONARY ARTERY DISEASE INVOLVING NATIVE CORONARY ARTERY OF NATIVE HEART WITHOUT ANGINA PECTORIS: ICD-10-CM

## 2022-06-15 PROCEDURE — G2252 BRIEF CHKIN BY MD/QHP, 11-20: HCPCS | Performed by: INTERNAL MEDICINE

## 2022-06-15 NOTE — PROGRESS NOTES
Virtual Telephone Check-In    This visit is conducted using Telemedicine with live, interactive video and audio. Patient resides in [place]   Patient understands and accepts financial responsibility for any deductible, co-insurance and/or co-pays associated with this service. Telehealth outside of [address] Verbal Consent   I conducted a telehealth visit with [address] on 6/15/2022   which was completed using two-way, real-time interactive audio and video communication. This has been done in good sara to provide continuity of care in the best interest of the provider-patient relationship, due to the COVID -19 public health crisis/national emergency where restrictions of face-to-face office visits are ongoing. Every conscious effort was taken to allow for sufficient and adequate time to complete the visit. The patient was made aware of the limitations of the telehealth visit, including treatment limitations as no physical exam could be performed. The patient was advised to call 911 or to go to the ER in case there was an emergency. The patient was also advised of the potential privacy & security concerns related to the telehealth platform. The patient was made aware of where to find [facility] notice of privacy practices, telehealth consent form and other related consent forms and documents. which are located on the [facility] website. The patient verbally agreed to telehealth consent form, related consents and the risks discussed. Lastly, the patient confirmed that they were in [place]. Included in this visit, time may have been spent reviewing labs, medications, radiology tests and decision making. Appropriate medical decision-making and tests are ordered as detailed in the plan of care above. Coding/billing information is submitted for this visit based on complexity of care and/or time spent for the visit.   Time spent: 20 minutes   HPI: Marcial Solano is an 79year old female who is calling to discuss her bone density results and blood tests results  CAD: her cardiologist is recommending an angiogram which she will have done in the near future   ROS:  General: Feels well overall  Skin: Denies any unusual skin lesions  Eyes: Denies blurred vision or double vision  All systems negative, except for above  Physical:  GENERAL: Alert and oriented, well developed, well nourished,in no apparent distress  SKIN: no rashes,no suspicious lesions on face  LUNGS: no audible wheezing  PSYCH: pleasant, appropriate mood and affect    Assessment and Plan  (M85.89) Osteopenia of multiple sites  (primary encounter diagnosis)  Plan: high FRAX score therefore 2000 IU of vitamin D daily was recommended with 1200 mg of calcium (600 mg through supplement and 600 mg through diet). We discussed about alendronate and prolia. Given patient's history of UGIB, prolia is recommended. Patient will read about it and call back when she is interested. (I25.10) Coronary artery disease involving native coronary artery of native heart without angina pectoris  Plan: Angiogram is recommended by her cardiologist. Lipid panel is stable.  Continue statin and BB    (E78.2) Mixed hyperlipidemia  Plan: continue atorvastatin     Cira Kerr DO

## 2022-06-22 RX ORDER — AMLODIPINE BESYLATE 2.5 MG/1
2.5 TABLET ORAL DAILY
Qty: 90 TABLET | Refills: 0 | Status: SHIPPED | OUTPATIENT
Start: 2022-06-22 | End: 2022-09-16

## 2022-07-13 ENCOUNTER — ORDER TRANSCRIPTION (OUTPATIENT)
Dept: ADMINISTRATIVE | Facility: HOSPITAL | Age: 82
End: 2022-07-13

## 2022-07-13 DIAGNOSIS — Z01.812 ENCOUNTER FOR PREPROCEDURE SCREENING LABORATORY TESTING FOR COVID-19: Primary | ICD-10-CM

## 2022-07-13 DIAGNOSIS — Z20.822 ENCOUNTER FOR PREPROCEDURE SCREENING LABORATORY TESTING FOR COVID-19: Primary | ICD-10-CM

## 2022-07-22 ENCOUNTER — HOSPITAL ENCOUNTER (OUTPATIENT)
Dept: GENERAL RADIOLOGY | Facility: HOSPITAL | Age: 82
Discharge: HOME | End: 2022-07-22
Attending: INTERNAL MEDICINE
Payer: MEDICARE

## 2022-07-22 ENCOUNTER — LAB ENCOUNTER (OUTPATIENT)
Dept: LAB | Facility: HOSPITAL | Age: 82
End: 2022-07-22
Attending: INTERNAL MEDICINE
Payer: MEDICARE

## 2022-07-22 DIAGNOSIS — Z20.822 ENCOUNTER FOR PREPROCEDURE SCREENING LABORATORY TESTING FOR COVID-19: ICD-10-CM

## 2022-07-22 DIAGNOSIS — Z00.00 ROUTINE PHYSICAL EXAMINATION: ICD-10-CM

## 2022-07-22 DIAGNOSIS — R05.9 COUGH: ICD-10-CM

## 2022-07-22 DIAGNOSIS — Z01.812 ENCOUNTER FOR PREPROCEDURE SCREENING LABORATORY TESTING FOR COVID-19: ICD-10-CM

## 2022-07-22 LAB
ANION GAP SERPL CALC-SCNC: 4 MMOL/L (ref 0–18)
BASOPHILS # BLD AUTO: 0.1 X10(3) UL (ref 0–0.2)
BASOPHILS NFR BLD AUTO: 1.1 %
BUN BLD-MCNC: 22 MG/DL (ref 7–18)
CALCIUM BLD-MCNC: 9.5 MG/DL (ref 8.5–10.1)
CHLORIDE SERPL-SCNC: 102 MMOL/L (ref 98–112)
CHOLEST SERPL-MCNC: 140 MG/DL (ref ?–200)
CO2 SERPL-SCNC: 32 MMOL/L (ref 21–32)
CREAT BLD-MCNC: 0.97 MG/DL
EOSINOPHIL # BLD AUTO: 0.39 X10(3) UL (ref 0–0.7)
EOSINOPHIL NFR BLD AUTO: 4.3 %
ERYTHROCYTE [DISTWIDTH] IN BLOOD BY AUTOMATED COUNT: 14.4 %
FASTING PATIENT LIPID ANSWER: YES
FASTING STATUS PATIENT QL REPORTED: YES
GLUCOSE BLD-MCNC: 116 MG/DL (ref 70–99)
HCT VFR BLD AUTO: 42.3 %
HDLC SERPL-MCNC: 67 MG/DL (ref 40–59)
HGB BLD-MCNC: 14 G/DL
IMM GRANULOCYTES # BLD AUTO: 0.04 X10(3) UL (ref 0–1)
IMM GRANULOCYTES NFR BLD: 0.4 %
LDLC SERPL CALC-MCNC: 53 MG/DL (ref ?–100)
LYMPHOCYTES # BLD AUTO: 3.39 X10(3) UL (ref 1–4)
LYMPHOCYTES NFR BLD AUTO: 37.1 %
MCH RBC QN AUTO: 30.3 PG (ref 26–34)
MCHC RBC AUTO-ENTMCNC: 33.1 G/DL (ref 31–37)
MCV RBC AUTO: 91.6 FL
MONOCYTES # BLD AUTO: 0.89 X10(3) UL (ref 0.1–1)
MONOCYTES NFR BLD AUTO: 9.7 %
NEUTROPHILS # BLD AUTO: 4.32 X10 (3) UL (ref 1.5–7.7)
NEUTROPHILS # BLD AUTO: 4.32 X10(3) UL (ref 1.5–7.7)
NEUTROPHILS NFR BLD AUTO: 47.4 %
NONHDLC SERPL-MCNC: 73 MG/DL (ref ?–130)
OSMOLALITY SERPL CALC.SUM OF ELEC: 290 MOSM/KG (ref 275–295)
PLATELET # BLD AUTO: 193 10(3)UL (ref 150–450)
POTASSIUM SERPL-SCNC: 4.3 MMOL/L (ref 3.5–5.1)
RBC # BLD AUTO: 4.62 X10(6)UL
SODIUM SERPL-SCNC: 138 MMOL/L (ref 136–145)
TRIGL SERPL-MCNC: 113 MG/DL (ref 30–149)
VLDLC SERPL CALC-MCNC: 16 MG/DL (ref 0–30)
WBC # BLD AUTO: 9.1 X10(3) UL (ref 4–11)

## 2022-07-22 PROCEDURE — 80048 BASIC METABOLIC PNL TOTAL CA: CPT

## 2022-07-22 PROCEDURE — 71046 X-RAY EXAM CHEST 2 VIEWS: CPT | Performed by: INTERNAL MEDICINE

## 2022-07-22 PROCEDURE — 85025 COMPLETE CBC W/AUTO DIFF WBC: CPT

## 2022-07-22 PROCEDURE — 36415 COLL VENOUS BLD VENIPUNCTURE: CPT

## 2022-07-22 PROCEDURE — 80061 LIPID PANEL: CPT

## 2022-07-22 NOTE — PROGRESS NOTES
Dear RN, please let the patient know   Chest x ray shows no infection in the lungs.  If having symptoms of cough she should follow up with us  [address] [place] [address]

## 2022-07-22 NOTE — PROGRESS NOTES
Dear RN, please let the patient know   Kidney, cholesterol and blood count is in the normal range  Cira Manzano DO

## 2022-07-23 LAB — SARS-COV-2 RNA RESP QL NAA+PROBE: NOT DETECTED

## 2022-07-25 ENCOUNTER — HOSPITAL ENCOUNTER (OUTPATIENT)
Dept: INTERVENTIONAL RADIOLOGY/VASCULAR | Facility: HOSPITAL | Age: 82
Discharge: HOME | End: 2022-07-25
Attending: INTERNAL MEDICINE | Admitting: INTERNAL MEDICINE
Payer: MEDICARE

## 2022-07-25 VITALS
HEART RATE: 67 BPM | WEIGHT: 175 LBS | HEIGHT: 65 IN | OXYGEN SATURATION: 100 % | RESPIRATION RATE: 14 BRPM | DIASTOLIC BLOOD PRESSURE: 59 MMHG | TEMPERATURE: 96 F | SYSTOLIC BLOOD PRESSURE: 123 MMHG | BODY MASS INDEX: 29.16 KG/M2

## 2022-07-25 DIAGNOSIS — R53.83 FATIGUE: ICD-10-CM

## 2022-07-25 DIAGNOSIS — R06.00 DYSPNEA: ICD-10-CM

## 2022-07-25 DIAGNOSIS — R94.39 ABNORMAL STRESS TEST: ICD-10-CM

## 2022-07-25 PROCEDURE — 93567 NJX CAR CTH SPRVLV AORTGRPHY: CPT | Performed by: INTERNAL MEDICINE

## 2022-07-25 PROCEDURE — 99152 MOD SED SAME PHYS/QHP 5/>YRS: CPT | Performed by: INTERNAL MEDICINE

## 2022-07-25 PROCEDURE — B2131ZZ FLUOROSCOPY OF MULTIPLE CORONARY ARTERY BYPASS GRAFTS USING LOW OSMOLAR CONTRAST: ICD-10-PCS | Performed by: INTERNAL MEDICINE

## 2022-07-25 PROCEDURE — B2181ZZ FLUOROSCOPY OF LEFT INTERNAL MAMMARY BYPASS GRAFT USING LOW OSMOLAR CONTRAST: ICD-10-PCS | Performed by: INTERNAL MEDICINE

## 2022-07-25 PROCEDURE — B3101ZZ FLUOROSCOPY OF THORACIC AORTA USING LOW OSMOLAR CONTRAST: ICD-10-PCS | Performed by: INTERNAL MEDICINE

## 2022-07-25 PROCEDURE — 99153 MOD SED SAME PHYS/QHP EA: CPT | Performed by: INTERNAL MEDICINE

## 2022-07-25 PROCEDURE — B2111ZZ FLUOROSCOPY OF MULTIPLE CORONARY ARTERIES USING LOW OSMOLAR CONTRAST: ICD-10-PCS | Performed by: INTERNAL MEDICINE

## 2022-07-25 PROCEDURE — 4A023N7 MEASUREMENT OF CARDIAC SAMPLING AND PRESSURE, LEFT HEART, PERCUTANEOUS APPROACH: ICD-10-PCS | Performed by: INTERNAL MEDICINE

## 2022-07-25 PROCEDURE — B2151ZZ FLUOROSCOPY OF LEFT HEART USING LOW OSMOLAR CONTRAST: ICD-10-PCS | Performed by: INTERNAL MEDICINE

## 2022-07-25 PROCEDURE — 93459 L HRT ART/GRFT ANGIO: CPT | Performed by: INTERNAL MEDICINE

## 2022-07-25 RX ORDER — CLOPIDOGREL BISULFATE 75 MG/1
75 TABLET ORAL DAILY
Qty: 90 TABLET | Refills: 3 | Status: SHIPPED | OUTPATIENT
Start: 2022-07-25

## 2022-07-25 RX ORDER — HEPARIN SODIUM 5000 [USP'U]/ML
INJECTION, SOLUTION INTRAVENOUS; SUBCUTANEOUS
Status: COMPLETED
Start: 2022-07-25 | End: 2022-07-25

## 2022-07-25 RX ORDER — LIDOCAINE HYDROCHLORIDE 10 MG/ML
INJECTION, SOLUTION EPIDURAL; INFILTRATION; INTRACAUDAL; PERINEURAL
Status: COMPLETED
Start: 2022-07-25 | End: 2022-07-25

## 2022-07-25 RX ORDER — MIDAZOLAM HYDROCHLORIDE 1 MG/ML
INJECTION INTRAMUSCULAR; INTRAVENOUS
Status: COMPLETED
Start: 2022-07-25 | End: 2022-07-25

## 2022-07-25 RX ORDER — SODIUM CHLORIDE 9 MG/ML
INJECTION, SOLUTION INTRAVENOUS
Status: COMPLETED | OUTPATIENT
Start: 2022-07-26 | End: 2022-07-25

## 2022-07-25 RX ADMIN — SODIUM CHLORIDE: 9 INJECTION, SOLUTION INTRAVENOUS at 07:30:00

## 2022-07-25 NOTE — PROGRESS NOTES
Pt s/p LHC via R femoral artery with angioseal closure. Site remained CDI with no bleeding/hematoma noted throughout recovery period including after voiding and ambulating in hallway. Post procedure VSS, denied pain, tolerated PO intake. Pt denied any numbness/tingling to RLE. IV d/c'd. Discharge instructions given to pt-verbalized understanding. Pt to  new prescription for plavix-called pharmacy to verify receipt. Pt to lobby via [facility] and  to drive home.

## 2022-07-25 NOTE — PROCEDURES
[facility]    PATIENT'S NAME: Mirza Still   ATTENDING PHYSICIAN: Rich Brothers M.D. OPERATING PHYSICIAN: Rich Brothers M.D. PATIENT ACCOUNT#:   [de-identified]    LOCATION:  [address]  MEDICAL RECORD #:   [id number]       YOB: 1940  ADMISSION DATE:       07/25/2022      OPERATION DATE:  07/25/2022    CARDIAC PROCEDURE TRANSCRIPTION      CARDIAC CATHETERIZATION    PREOPERATIVE DIAGNOSIS:    1. Abnormal stress test.  2.   History of remote coronary bypass grafting surgery. 3.   Paroxysmal atrial fibrillation. 4.   Hyperlipidemia. POSTOPERATIVE DIAGNOSIS:  Coronary artery disease. PROCEDURE PERFORMED:      CLINICAL HISTORY:  The patient today was brought in for angiography. She has an anterolateral defect and has had some dyspnea on exertion. SEDATION:  The patient received conscious sedation. This was monitored by myself and the catheterization lab staff. Conscious sedation started at 0755 and ended at 0835. DESCRIPTION OF PROCEDURE:  After informed consent was obtained, patient was prepped and draped in the usual sterile fashion. Using ultrasound, the right common femoral artery was identified and entered using a micropuncture needle. A micropuncture sheath followed, followed by placement of a 6-Egyptian sheath. Riki right and left, pigtail, and mammary catheters were used for the diagnostic procedure. At the end of the case, a 6-Egyptian Angio-Seal was used to seal the right common femoral artery site. There were no apparent acute complications noted. The patient tolerated the procedure well. CORONARIES:  Injection in the left main coronary artery revealed an ostial left main stenosis of about 80%. The left main bifurcates into left anterior descending and circumflex coronary arteries. The left anterior descending artery is calcified proximally, yet free of significant disease.   There is a left internal mammary anastomosis in the mid left anterior descending artery. I see retrograde flow back in this artery. The mammary graft appears very small. Otherwise, there is mild nonobstructive disease. Diagonal branches are free of significant disease. Circumflex coronary artery gives rise to a large first obtuse marginal branch. This branch has a vein graft anastomosis in its proximal section. This appears patent. There is no distal disease noted. The main circumflex gives rise to a small posterolateral branch. The right coronary artery is occluded in its midsection. I do not see any late filling of the right coronary artery from the left system. GRAFT ANGIOGRAPHY:    1. Saphenous vein graft to the obtuse marginal branch is widely patent. It is a large graft. It retrograde fills the entire left coronary system. 2.   Left internal mammary artery graft to the left anterior descending artery is patent proximally, yet it is atretic in its midsection. Faint contrast is noted going to the coronary tree. 3.   There is a third graft by report; however, I am not able to find the stump of this. SUBCLAVIAN ANGIOGRAPHY:  Subclavian angiography was done using a JR4 catheter. This revealed eccentric plaque of about 20% 30%. This also is present in a somewhat more focal nature just at the takeoff of the vertebral artery. VENTRICULOGRAPHY:  Ventriculography reveals hyperdynamic left ventricular systolic function with an estimated ejection fraction of 60%. There does appear to be 2+ mitral insufficiency, although this may be complicated by the fact there was a PVC. Left ventricular end-diastolic pressure was 10 mmHg. No significant gradient was noted upon pullback across the aortic valve. Ascending aortography was performed. I do not see any clear clot stumps from the aorta. The only graft noted is the previously-mentioned vein graft to the obtuse marginal branch. ABDOMINAL ANGIOGRAPHY:  Abdominal angiography was performed.   This revealed an atherosclerotic-appearing aorta, but no severe disease is noted. There is approximately a 40% left common iliac stenosis. Internal iliacs are patent bilaterally. External iliacs are patent bilaterally. Common femoral is patent on the left. SFA and profunda are patent proximally on the left. On the right, profunda femoris is patent. There is a large calcific plaque with an occlusion of the SFA on the right    SUMMARY:    1. Calcified coronary tree with left main stenosis. 2.   Patent vein graft to an obtuse marginal branch which retrograde fills the entire left system. 3.   Atretic LIMA. 4.   Normal left ventricular systolic function with at least 2+ mitral insufficiency. RECOMMENDATIONS:  Further recommendations will be made after discussion of the case with the patient and the family. She may need an assessment of her MR as well as, I suspect, PCI of her left main coronary artery.      Dictated By Delicia Alonzo M.D.  d: 07/25/2022 08:42:52  t: 07/25/2022 13:01:03  [facility] [id number]  AR/

## 2022-08-12 RX ORDER — HYDROCODONE BITARTRATE AND ACETAMINOPHEN 5; 325 MG/1; MG/1
1 TABLET ORAL EVERY 6 HOURS PRN
COMMUNITY

## 2022-08-15 ENCOUNTER — HOSPITAL ENCOUNTER (OUTPATIENT)
Dept: INTERVENTIONAL RADIOLOGY/VASCULAR | Facility: HOSPITAL | Age: 82
Setting detail: OBSERVATION
Discharge: HOME | End: 2022-08-16
Attending: INTERNAL MEDICINE | Admitting: INTERNAL MEDICINE
Payer: MEDICARE

## 2022-08-15 DIAGNOSIS — R06.00 DYSPNEA: ICD-10-CM

## 2022-08-15 DIAGNOSIS — R94.39 ABNORMAL STRESS TEST: ICD-10-CM

## 2022-08-15 DIAGNOSIS — R53.83 FATIGUE: ICD-10-CM

## 2022-08-15 LAB
ATRIAL RATE: 73 BPM
ISTAT ACTIVATED CLOTTING TIME: 277 SECONDS (ref 74–137)
ISTAT ACTIVATED CLOTTING TIME: 462 SECONDS (ref 74–137)
P AXIS: 75 DEGREES
P-R INTERVAL: 150 MS
Q-T INTERVAL: 418 MS
QRS DURATION: 78 MS
QTC CALCULATION (BEZET): 460 MS
R AXIS: 48 DEGREES
SARS-COV-2 RNA RESP QL NAA+PROBE: NOT DETECTED
T AXIS: 72 DEGREES
VENTRICULAR RATE: 73 BPM

## 2022-08-15 PROCEDURE — 85347 COAGULATION TIME ACTIVATED: CPT

## 2022-08-15 PROCEDURE — 99152 MOD SED SAME PHYS/QHP 5/>YRS: CPT | Performed by: INTERNAL MEDICINE

## 2022-08-15 PROCEDURE — 0715T HC PERCUTANEOUS TRANSLUMINAL CORONARY LITHOTRIPSY: CPT | Performed by: INTERNAL MEDICINE

## 2022-08-15 PROCEDURE — 02F03ZZ FRAGMENTATION IN CORONARY ARTERY, ONE ARTERY, PERCUTANEOUS APPROACH: ICD-10-PCS | Performed by: INTERNAL MEDICINE

## 2022-08-15 PROCEDURE — 92978 ENDOLUMINL IVUS OCT C 1ST: CPT | Performed by: INTERNAL MEDICINE

## 2022-08-15 PROCEDURE — 02703ZZ DILATION OF CORONARY ARTERY, ONE ARTERY, PERCUTANEOUS APPROACH: ICD-10-PCS | Performed by: INTERNAL MEDICINE

## 2022-08-15 PROCEDURE — 99211 OFF/OP EST MAY X REQ PHY/QHP: CPT

## 2022-08-15 PROCEDURE — 92920 PRQ TRLUML C ANGIOP 1ART&/BR: CPT | Performed by: INTERNAL MEDICINE

## 2022-08-15 PROCEDURE — 99153 MOD SED SAME PHYS/QHP EA: CPT | Performed by: INTERNAL MEDICINE

## 2022-08-15 PROCEDURE — 93005 ELECTROCARDIOGRAM TRACING: CPT

## 2022-08-15 PROCEDURE — 93010 ELECTROCARDIOGRAM REPORT: CPT | Performed by: INTERNAL MEDICINE

## 2022-08-15 PROCEDURE — B240ZZ3 ULTRASONOGRAPHY OF SINGLE CORONARY ARTERY, INTRAVASCULAR: ICD-10-PCS | Performed by: INTERNAL MEDICINE

## 2022-08-15 PROCEDURE — 027034Z DILATION OF CORONARY ARTERY, ONE ARTERY WITH DRUG-ELUTING INTRALUMINAL DEVICE, PERCUTANEOUS APPROACH: ICD-10-PCS | Performed by: INTERNAL MEDICINE

## 2022-08-15 RX ORDER — HYDROCODONE BITARTRATE AND ACETAMINOPHEN 5; 325 MG/1; MG/1
1 TABLET ORAL EVERY 6 HOURS PRN
Status: DISCONTINUED | OUTPATIENT
Start: 2022-08-15 | End: 2022-08-16

## 2022-08-15 RX ORDER — SODIUM CHLORIDE 9 MG/ML
INJECTION, SOLUTION INTRAVENOUS CONTINUOUS
Status: CANCELLED | OUTPATIENT
Start: 2022-08-15 | End: 2022-08-15

## 2022-08-15 RX ORDER — ATORVASTATIN CALCIUM 20 MG/1
20 TABLET, FILM COATED ORAL NIGHTLY
Status: DISCONTINUED | OUTPATIENT
Start: 2022-08-15 | End: 2022-08-16

## 2022-08-15 RX ORDER — MIDAZOLAM HYDROCHLORIDE 1 MG/ML
INJECTION INTRAMUSCULAR; INTRAVENOUS
Status: COMPLETED
Start: 2022-08-15 | End: 2022-08-15

## 2022-08-15 RX ORDER — AMLODIPINE BESYLATE 2.5 MG/1
2.5 TABLET ORAL DAILY
Status: DISCONTINUED | OUTPATIENT
Start: 2022-08-16 | End: 2022-08-16

## 2022-08-15 RX ORDER — SODIUM CHLORIDE 9 MG/ML
INJECTION, SOLUTION INTRAVENOUS CONTINUOUS
Status: ACTIVE | OUTPATIENT
Start: 2022-08-15 | End: 2022-08-15

## 2022-08-15 RX ORDER — SODIUM CHLORIDE 9 MG/ML
INJECTION, SOLUTION INTRAVENOUS
Status: DISCONTINUED | OUTPATIENT
Start: 2022-08-16 | End: 2022-08-15 | Stop reason: HOSPADM

## 2022-08-15 RX ORDER — LIDOCAINE HYDROCHLORIDE 10 MG/ML
INJECTION, SOLUTION EPIDURAL; INFILTRATION; INTRACAUDAL; PERINEURAL
Status: COMPLETED
Start: 2022-08-15 | End: 2022-08-15

## 2022-08-15 RX ORDER — HEPARIN SODIUM 5000 [USP'U]/ML
INJECTION, SOLUTION INTRAVENOUS; SUBCUTANEOUS
Status: COMPLETED
Start: 2022-08-15 | End: 2022-08-15

## 2022-08-15 RX ORDER — ASPIRIN 81 MG/1
81 TABLET ORAL DAILY
Status: DISCONTINUED | OUTPATIENT
Start: 2022-08-16 | End: 2022-08-15

## 2022-08-15 RX ORDER — VALSARTAN AND HYDROCHLOROTHIAZIDE 160; 12.5 MG/1; MG/1
1 TABLET, FILM COATED ORAL DAILY
Status: DISCONTINUED | OUTPATIENT
Start: 2022-08-16 | End: 2022-08-15

## 2022-08-15 RX ORDER — CLOPIDOGREL BISULFATE 75 MG/1
75 TABLET ORAL DAILY
Status: DISCONTINUED | OUTPATIENT
Start: 2022-08-16 | End: 2022-08-16

## 2022-08-15 RX ADMIN — ATORVASTATIN CALCIUM 20 MG: 20 TABLET, FILM COATED ORAL at 20:04:00

## 2022-08-15 RX ADMIN — SODIUM CHLORIDE: 9 INJECTION, SOLUTION INTRAVENOUS at 12:15:00
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
## 2022-08-15 NOTE — CARDIAC REHAB
Cardiac rehab saw patient for cad education post stent. Education completed with patient. Phase 2 CR offered and explained to patient- she declines at this time. She lives in [facility] and doesn't want to drive that far - also having some vascular leg issue per patient that limits her from an exercise standpoint.

## 2022-08-15 NOTE — OPERATIVE REPORT
Full note dictated,    90% ostial left main  Status post shockwave balloon PTCA with 3.5x12  Left main stented from ostium to the proximal LAD using a three 5 x 16 Megatron  This was postdilated proximally with a 4 mm NC    Circumflex was rewired and kissing balloon PTCA was done. 0% post    Patient with significant right common femoral artery stenosis. Entry point was above the stenosis.   She would likely need surgical intervention of the right common femoral.  Hopefully, this can be done electively    Gasper Delgado MD

## 2022-08-15 NOTE — DIETARY NOTE
Clinical Nutrition    Dietitian consult received per cardiac rehab standing order. Pt to be educated by cardiac rehab staff and encouraged to attend outpatient classes taught by RD. RD available PRN.     Laura Baker MS, RD, LDN  Clinical Dietitian  Pager #: [phone]

## 2022-08-15 NOTE — PROCEDURES
[facility]    PATIENT'S NAME: Talya Swift   ATTENDING PHYSICIAN: Madan Ying M.D. OPERATING PHYSICIAN: Madan Ying M.D. PATIENT ACCOUNT#:   [de-identified]    LOCATION:  [address] [place] [address]  MEDICAL RECORD #:   [id number]       YOB: 1940  ADMISSION DATE:       08/15/2022      OPERATION DATE:  08/15/2022    CARDIAC PROCEDURE TRANSCRIPTION      PERCUTANEOUS CORONARY INTERVENTION     PREOPERATIVE DIAGNOSIS:    1. Abnormal stress test suggestive of anterolateral ischemia. 2.   Near-occlusion of the left main. 3.   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 4.   Patent saphenous vein graft to the ramus intermedius. POSTOPERATIVE DIAGNOSIS:    PROCEDURE PERFORMED:    1. Shockwave lithotripsy of the left main. 2.   PCI of the left main. 3.   Kissing balloon angioplasty of the circumflex and left main. 4.   Iliofemoral angiography. SEDATION:  The patient received conscious sedation. This was monitored by myself and the catheterization lab staff. This started at [address] and ended at 46. CLINICAL HISTORY:  The patient was brought back today for PCI. Previous angiography demonstrated an occluded LIMA to LAD. Her entire left system was filling via saphenous vein graft to a ramus intermedius branch. Patient also noted to have PAD. Patient has iliac disease on the left. She has mid to distal common femoral disease on the right. Initially, it was felt that we may need used a 7-Albanian system so I went with a femoral access system. DESCRIPTION OF PROCEDURE:  After informed consent was obtained, patient prepped and draped in usual sterile fashion. Using ultrasound, right common femoral artery was identified. I went somewhat superiorly above the midfemoral artery plaque. Entry was with direct visualization with a micropuncture needle. A micropuncture sheath followed.   Iliofemoral angiography revealed that we were in the top quadrant of the common femoral just above the diseased segment. A 6-Panamanian sheath was subsequently placed. Heparin was given to maintain ACT over 250 seconds. Next, a JL 3.0 guiding catheter was used to access the left cusp. The patient is known to have an ostial 90% left main which was somewhat calcified. The left main was somewhat angulated and off axis. With the 3.0 catheter, with multiple views, we were able to get fairly close to the ostium. A 190 cm Runthrough wire was initially placed through the left main but went directly into the circumflex. We left this wire in place. A Reji blue wire was brought out and this found its way into the LAD. Once this was done, I tried to do an intravascular ultrasound which would not pass through the ostium. This was a 5-Panamanian [place]cano. Next, the area was dilated with a 2.5 mm balloon. With dilation, there was a little more give to the stenosis. I used same intravascular ultrasound catheter and did interrogation. The left main itself was shorter than 8 mm. The proximal LAD did not have significant disease with a residual diameter of 3.3 x 3.8. The left main appeared to be closer to 4 mm. Next, a 3.5 Shockwave balloon was introduced into the ostium of the left main and 4 treatments were done. After Shockwave lithotripsy, the left main appeared to [place]. \"  However, by now the guiding catheter was able to sit itself into the left main itself. Next, a 3. 5 x 16 Megatron stent was chosen. This went from the proximal LAD back to the ostium of the left main. Once we were happy with positioning, this was deployed. The ostium was postdeployed to 16 atmospheres. Next, the remaining wire in the circumflex was removed. The ostium of the left main and the distal left main into the proximal LAD were postdilated with high pressures with a 4 mm NC balloon. Next, a Runthrough wire was placed through the struts into the circumflex.   Next, I used a 3.0 balloon to open up the struts. A 3.5 x 12 noncompliant and a 3.0 x 12 noncompliant were placed in the LAD and circumflex respectively. Kissing balloon angioplasty was performed. Subsequent intravascular interrogation revealed good placement of the stent without evidence of dissection distally in the stented segment. The ostium of the left main appeared to be covered. Good flow was noted into the circumflex. Procedure was terminated. A 6-Faroese Angio-Seal was used to seal the right common femoral artery site. There were no apparent complications noted, and the patient tolerated the procedure well. SUMMARY:  In summary, the patient today underwent PCI of the left main as discussed above with a nice acute angiographic result.     Dictated By Maricarmen Loaiza M.D.  d: 08/15/2022 11:08:50  t: 08/15/2022 13:43:27  [facility] [id number]  /

## 2022-08-15 NOTE — PLAN OF CARE
Received patient from cath lab @ [phone] s/p PCI with stent placement to left main. Right groin site soft, CDI, no hematoma. VSS. Patient denies any pain. Patient's  @ bedside. Dr. Binh Singh @ bedside post procedure to talk with patient and . After patient voided on bedpan, small amount of bloody drainage noted on dressing. Site soft, no hematoma. Patient is being admitted to CTU 2. Report called to GINNA Bolaños. Patient transferred to [phone] by bed with belongings.  alongside. Bedside groin check completed with oncoming RN. Small area of bloody drainage noted.

## 2022-08-16 VITALS
HEIGHT: 65 IN | BODY MASS INDEX: 29.16 KG/M2 | RESPIRATION RATE: 17 BRPM | DIASTOLIC BLOOD PRESSURE: 55 MMHG | SYSTOLIC BLOOD PRESSURE: 145 MMHG | WEIGHT: 175 LBS | HEART RATE: 74 BPM | OXYGEN SATURATION: 97 % | TEMPERATURE: 98 F

## 2022-08-16 LAB
ANION GAP SERPL CALC-SCNC: 4 MMOL/L (ref 0–18)
BUN BLD-MCNC: 22 MG/DL (ref 7–18)
CALCIUM BLD-MCNC: 9.6 MG/DL (ref 8.5–10.1)
CHLORIDE SERPL-SCNC: 102 MMOL/L (ref 98–112)
CO2 SERPL-SCNC: 30 MMOL/L (ref 21–32)
CREAT BLD-MCNC: 0.79 MG/DL
ERYTHROCYTE [DISTWIDTH] IN BLOOD BY AUTOMATED COUNT: 14.5 %
GFR SERPLBLD BASED ON 1.73 SQ M-ARVRAT: 74 ML/MIN/1.73M2 (ref 60–?)
GLUCOSE BLD-MCNC: 102 MG/DL (ref 70–99)
HCT VFR BLD AUTO: 39.5 %
HGB BLD-MCNC: 12.9 G/DL
MCH RBC QN AUTO: 30.4 PG (ref 26–34)
MCHC RBC AUTO-ENTMCNC: 32.7 G/DL (ref 31–37)
MCV RBC AUTO: 93.2 FL
OSMOLALITY SERPL CALC.SUM OF ELEC: 286 MOSM/KG (ref 275–295)
PLATELET # BLD AUTO: 190 10(3)UL (ref 150–450)
POTASSIUM SERPL-SCNC: 4.6 MMOL/L (ref 3.5–5.1)
RBC # BLD AUTO: 4.24 X10(6)UL
SODIUM SERPL-SCNC: 136 MMOL/L (ref 136–145)
WBC # BLD AUTO: 8.9 X10(3) UL (ref 4–11)

## 2022-08-16 PROCEDURE — 80048 BASIC METABOLIC PNL TOTAL CA: CPT | Performed by: NURSE PRACTITIONER

## 2022-08-16 PROCEDURE — 85027 COMPLETE CBC AUTOMATED: CPT | Performed by: NURSE PRACTITIONER

## 2022-08-16 RX ADMIN — CLOPIDOGREL BISULFATE 75 MG: 75 TABLET ORAL at 08:10:00

## 2022-08-16 RX ADMIN — Medication: at 08:21:00

## 2022-08-16 RX ADMIN — AMLODIPINE BESYLATE 2.5 MG: 2.5 TABLET ORAL at 08:10:00

## 2022-08-16 NOTE — PLAN OF CARE
D: Patient received orders for discharge    A: Reviewed post cath instructions, handout given and dressing removed; reviewed discharge instructions, including appointment made and to be made; reviewed discharge medications, no changes; PIV removed; cardiac monitor removed; belongings removed from room. R: Patient left via wheelchair with PCT to patient's 's car without incident.
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
## 2022-08-17 ENCOUNTER — PATIENT OUTREACH (OUTPATIENT)
Dept: CASE MANAGEMENT | Age: 82
End: 2022-08-17

## 2022-08-17 NOTE — PAYOR COMM NOTE
--------------  ADMISSION REVIEW     Payor: [facility] [place] #:  [id number]  Authorization Number: [id number]    Admit Orders (From admission, onward)     Start     Ordered    08/15/22 1213  Place in observation Once  Once        Ordering Provider: JUAN CARLOS Harris   Question Answer Comment   Admitting Physician Jennifer Tapia V    Diagnosis Abnormal stress test        08/15/22 1213

## 2022-08-18 NOTE — PROGRESS NOTES
Saroj Summers MRN# [id number]   Age: 60 year old YOB: 1960     Date of Admission:  3/9/2021  Date of Discharge:  3/12/2021  Admitting Physician:  Tracy Rolle MD  Discharge Physician:  Tracy Rolle MD      DISCHARGE  DX   Alcohol use disorder severe  Alcohol alcohol dependence             Event Leading to Hospitalization:     See Admission note by admitting provider for patient encounter. for additional details.          Hospital Course:   PATIENT was admitted to [facility] attending  under DR rolle, please review the detailed admit note on 3/10/21   The patient was placed under status 15 (15 minute checks) to ensure patient safety.   MSSA protocol was initiated due to the patient's history of alcohol abuse and concern for withdrawal symptoms.  CBC, BMP and utox obtained.    All outpatient medications were continued    PATIENTdid participate in groups and was visible in the milieu.     The patient's symptoms of alochol withdrawl improved.     Patients energy motivation , sleep appetite improved.  Pt completed detox . It was un eventful.      Discussed with patient medications for craving.  Spoke with patient about triggers coping skills relapse prevention.    CONSULTS DONE DURING PATIENTS HOSPITALIZATION.  Patient was seen by medicine on date    This as per their medical consult    Saroj Summers,  1960, MRN [id number]  Code status: Full Code     ASSESSMENT / PLAN   Discussion: Saroj is a 60 year old male with past medical history including hypertension, hepatic steatosis, methamphetamine use, cocaine use, alcohol use disorder, adrenal mass, and recent hematemesis with hematochezia who presented to ED for detox.  Hospital medicine consultation for co-management of detox.     #Alcohol use disorder  #Alcohol withdrawal  Saroj reported alcohol use 1 L/day to emergency department physician.  Reported last drink on 3/8 around 2200.  He does have a history of  NCM spoke with patient's son Christiane Melendez, states pt has been readmitted to [facility] for Pacemaker placement. Encounter closing. alcohol withdrawal seizures and delirium tremens.  He states that he was trying to cut back on alcohol by himself at home and experienced seizures. At that point, he reports that he consumed more alcohol to stop the seizures.  He realized that he was unable to detox at home and subsequently presented to the hospital.  He did have a long period of sobriety for 26 years and has relapsed over the past week or so.  Interested in treatment.    -Management per primary team    -Agree with MSSA, Valium    -Continue on multivitamin, thiamine, folate supplementation.     #Generalized pain  Saroj reports generalized pain which is worse throughout his abdomen, neck, shoulders, anterior chest.  He denies having this feeling with withdrawal in the past and is unsure why he is having so much pain with withdrawal this time.  We discussed obtaining a creatinine kinase level.  This was normal.  In addition, discussed pharmacologic treatment.    -Continue treatment of alcohol withdrawal    -Added gabapentin 3 times daily    -Methocarbamol as needed    -As he does describe some pain in chest area, obtain EKG     #Recent hematemesis, resolved  #Recent hematochezia, resolved  Presented to ED on 3/5/2021 with above complaints in addition to abdominal pain.  He was admitted but later discharged AGAINST MEDICAL ADVICE.  Saroj reports that hematemesis and hematochezia have been resolved and he has had no further episodes since the left the hospital on 3/5.    -Recommend adding pantoprazole 40 mg daily (ordered)     #Hypertension  Patient reported to pharmacist that he had been on antihypertensive medication prior and was interested in restarting this.  He was unsure which medication this was, and pharmacy investigation revealed that he had filled losartan 50 mg in June 2020.    -will prescribe losartan - start at 25mg     #Leukocytosis  Very slight leukocytosis with neutrophilia.  No current concern for infection based on physical exam  today.  Likely reactive.    -Recommend recheck tomorrow (ordered)     #Thrombocytopenia  Platelet count 148 today.  Previous 207 on 3/5/2021.  Possibly bone marrow suppression due to alcohol use.    -Recheck in a.m.              Pt was seen by cm  As per recommendations from cm    Pt completed comprehensive assessment, bed saved in [facility] on 3/12/21, time TBD. [facility] placement summary completed and faxed to utilization review at [phone] for funding authorization. Pt notified he can admit on 3/12. AVS updated.          Labs:reviewed with patient       Recent Results (from the past 48 hour(s))   EKG 12-lead, complete    Collection Time: 03/10/21 12:52 PM   Result Value Ref Range    Interpretation ECG Click View Image link to view waveform and result    Magnesium    Collection Time: 03/11/21  7:34 AM   Result Value Ref Range    Magnesium 1.8 1.6 - 2.3 mg/dL   Calcium ionized whole blood    Collection Time: 03/11/21  7:34 AM   Result Value Ref Range    Calcium Ionized Whole Blood 4.7 4.4 - 5.2 mg/dL   WBC count    Collection Time: 03/11/21  7:34 AM   Result Value Ref Range    WBC 6.7 4.0 - 11.0 10e9/L   Platelet count    Collection Time: 03/11/21  7:34 AM   Result Value Ref Range    Platelet Count 141 (L) 150 - 450 10e9/L   Potassium    Collection Time: 03/11/21  7:34 AM   Result Value Ref Range    Potassium 4.1 3.4 - 5.3 mmol/L         Recent Results (from the past 240 hour(s))   CBC with platelets differential    Collection Time: 03/04/21  9:25 PM   Result Value Ref Range    WBC 9.3 4.0 - 11.0 10e9/L    RBC Count 5.14 4.4 - 5.9 10e12/L    Hemoglobin 16.4 13.3 - 17.7 g/dL    Hematocrit 46.1 40.0 - 53.0 %    MCV 90 78 - 100 fl    MCH 31.9 26.5 - 33.0 pg    MCHC 35.6 31.5 - 36.5 g/dL    RDW 12.8 10.0 - 15.0 %    Platelet Count 225 150 - 450 10e9/L    Diff Method Automated Method     % Neutrophils 64.8 %    % Lymphocytes 23.6 %    % Monocytes 9.0 %    % Eosinophils 1.4 %    % Basophils 0.8 %    % Immature Granulocytes  0.4 %    Nucleated RBCs 0 0 /100    Absolute Neutrophil 6.0 1.6 - 8.3 10e9/L    Absolute Lymphocytes 2.2 0.8 - 5.3 10e9/L    Absolute Monocytes 0.8 0.0 - 1.3 10e9/L    Absolute Eosinophils 0.1 0.0 - 0.7 10e9/L    Absolute Basophils 0.1 0.0 - 0.2 10e9/L    Abs Immature Granulocytes 0.0 0 - 0.4 10e9/L    Absolute Nucleated RBC 0.0    INR    Collection Time: 03/04/21  9:25 PM   Result Value Ref Range    INR 1.20 (H) 0.86 - 1.14   ABO/Rh type and screen    Collection Time: 03/04/21  9:25 PM   Result Value Ref Range    ABO O     RH(D) Pos     Antibody Screen Neg     Test Valid Only At          [facility]    Specimen Expires 03/07/2021    Comprehensive metabolic panel    Collection Time: 03/04/21  9:25 PM   Result Value Ref Range    Sodium 137 133 - 144 mmol/L    Potassium 3.1 (L) 3.4 - 5.3 mmol/L    Chloride 100 94 - 109 mmol/L    Carbon Dioxide 28 20 - 32 mmol/L    Anion Gap 9 3 - 14 mmol/L    Glucose 204 (H) 70 - 99 mg/dL    Urea Nitrogen 14 7 - 30 mg/dL    Creatinine 1.04 0.66 - 1.25 mg/dL    GFR Estimate 77 >60 mL/min/[1.73_m2]    GFR Estimate If Black 89 >60 mL/min/[1.73_m2]    Calcium 8.2 (L) 8.5 - 10.1 mg/dL    Bilirubin Total 0.6 0.2 - 1.3 mg/dL    Albumin 3.5 3.4 - 5.0 g/dL    Protein Total 6.6 (L) 6.8 - 8.8 g/dL    Alkaline Phosphatase 110 40 - 150 U/L    ALT 34 0 - 70 U/L    AST 27 0 - 45 U/L   Lipase    Collection Time: 03/04/21  9:25 PM   Result Value Ref Range    Lipase 168 73 - 393 U/L   Alcohol ethyl    Collection Time: 03/04/21  9:25 PM   Result Value Ref Range    Ethanol g/dL 0.31 (HH) <0.01 g/dL   CK total    Collection Time: 03/04/21  9:25 PM   Result Value Ref Range    CK Total 113 30 - 300 U/L   Troponin I    Collection Time: 03/04/21  9:25 PM   Result Value Ref Range    Troponin I ES <0.015 0.000 - 0.045 ug/L   Asymptomatic SARS-CoV-2 COVID-19 Virus (Coronavirus) by PCR    Collection Time: 03/04/21  9:58 PM    Specimen: Nasopharyngeal   Result Value Ref  Range    SARS-CoV-2 Virus Specimen Source Nasopharyngeal     SARS-CoV-2 PCR Result NEGATIVE     SARS-CoV-2 PCR Comment       Testing was performed using the Xpert Xpress SARS-CoV-2 Assay on the Cepheid Gene-Xpert   Instrument Systems. Additional information about this Emergency Use Authorization (EUA)   assay can be found via the Lab Guide.     EKG 12 lead    Collection Time: 03/04/21 10:48 PM   Result Value Ref Range    Interpretation ECG Click View Image link to view waveform and result    EKG 12 lead    Collection Time: 03/04/21 10:49 PM   Result Value Ref Range    Interpretation ECG Click View Image link to view waveform and result    EKG 12-lead, tracing only    Collection Time: 03/04/21 10:49 PM   Result Value Ref Range    Interpretation ECG Click View Image link to view waveform and result    ISTAT gases lactate massimo POCT    Collection Time: 03/04/21 11:01 PM   Result Value Ref Range    Ph Venous 7.43 7.32 - 7.43 pH    PCO2 Venous 42 40 - 50 mm Hg    PO2 Venous 55 (H) 25 - 47 mm Hg    Bicarbonate Venous 28 21 - 28 mmol/L    O2 Sat Venous 89 %    Lactic Acid 4.4 (HH) 0.7 - 2.1 mmol/L   Magnesium    Collection Time: 03/05/21  3:53 AM   Result Value Ref Range    Magnesium 1.9 1.6 - 2.3 mg/dL   Phosphorus    Collection Time: 03/05/21  3:53 AM   Result Value Ref Range    Phosphorus 3.2 2.5 - 4.5 mg/dL   Lactic acid whole blood    Collection Time: 03/05/21  3:53 AM   Result Value Ref Range    Lactic Acid 4.2 (HH) 0.7 - 2.0 mmol/L   CBC with platelets    Collection Time: 03/05/21  3:53 AM   Result Value Ref Range    WBC 10.6 4.0 - 11.0 10e9/L    RBC Count 4.82 4.4 - 5.9 10e12/L    Hemoglobin 14.9 13.3 - 17.7 g/dL    Hematocrit 43.2 40.0 - 53.0 %    MCV 90 78 - 100 fl    MCH 30.9 26.5 - 33.0 pg    MCHC 34.5 31.5 - 36.5 g/dL    RDW 13.1 10.0 - 15.0 %    Platelet Count 207 150 - 450 10e9/L   Potassium    Collection Time: 03/05/21  3:53 AM   Result Value Ref Range    Potassium 3.3 (L) 3.4 - 5.3 mmol/L   Comprehensive  metabolic panel    Collection Time: 03/05/21  5:55 AM   Result Value Ref Range    Sodium 138 133 - 144 mmol/L    Potassium 3.5 3.4 - 5.3 mmol/L    Chloride 104 94 - 109 mmol/L    Carbon Dioxide 27 20 - 32 mmol/L    Anion Gap 7 3 - 14 mmol/L    Glucose 178 (H) 70 - 99 mg/dL    Urea Nitrogen 11 7 - 30 mg/dL    Creatinine 0.91 0.66 - 1.25 mg/dL    GFR Estimate >90 >60 mL/min/[1.73_m2]    GFR Estimate If Black >90 >60 mL/min/[1.73_m2]    Calcium 7.4 (L) 8.5 - 10.1 mg/dL    Bilirubin Total 1.1 0.2 - 1.3 mg/dL    Albumin 3.0 (L) 3.4 - 5.0 g/dL    Protein Total 5.8 (L) 6.8 - 8.8 g/dL    Alkaline Phosphatase 87 40 - 150 U/L    ALT 29 0 - 70 U/L    AST 22 0 - 45 U/L   Hemoglobin    Collection Time: 03/05/21  5:55 AM   Result Value Ref Range    Hemoglobin 14.1 13.3 - 17.7 g/dL   Hemoglobin    Collection Time: 03/05/21  3:37 PM   Result Value Ref Range    Hemoglobin 13.7 13.3 - 17.7 g/dL   Lactic acid whole blood    Collection Time: 03/05/21  3:37 PM   Result Value Ref Range    Lactic Acid 2.3 (H) 0.7 - 2.0 mmol/L   Potassium    Collection Time: 03/05/21  3:37 PM   Result Value Ref Range    Potassium 4.0 3.4 - 5.3 mmol/L   Alcohol breath test POCT    Collection Time: 03/09/21 10:38 AM   Result Value Ref Range    Alcohol Breath Test 0.028 (A) 0.00 - 0.01   Comprehensive metabolic panel    Collection Time: 03/09/21 11:06 AM   Result Value Ref Range    Sodium 137 133 - 144 mmol/L    Potassium 3.1 (L) 3.4 - 5.3 mmol/L    Chloride 99 94 - 109 mmol/L    Carbon Dioxide 25 20 - 32 mmol/L    Anion Gap 13 3 - 14 mmol/L    Glucose 205 (H) 70 - 99 mg/dL    Urea Nitrogen 12 7 - 30 mg/dL    Creatinine 0.78 0.66 - 1.25 mg/dL    GFR Estimate >90 >60 mL/min/[1.73_m2]    GFR Estimate If Black >90 >60 mL/min/[1.73_m2]    Calcium 9.4 8.5 - 10.1 mg/dL    Bilirubin Total 0.9 0.2 - 1.3 mg/dL    Albumin 3.6 3.4 - 5.0 g/dL    Protein Total 6.7 (L) 6.8 - 8.8 g/dL    Alkaline Phosphatase 118 40 - 150 U/L    ALT 31 0 - 70 U/L    AST 35 0 - 45 U/L   CBC  with platelets differential    Collection Time: 03/09/21 11:06 AM   Result Value Ref Range    WBC 12.4 (H) 4.0 - 11.0 10e9/L    RBC Count 4.38 (L) 4.4 - 5.9 10e12/L    Hemoglobin 14.0 13.3 - 17.7 g/dL    Hematocrit 39.2 (L) 40.0 - 53.0 %    MCV 90 78 - 100 fl    MCH 32.0 26.5 - 33.0 pg    MCHC 35.7 31.5 - 36.5 g/dL    RDW 13.4 10.0 - 15.0 %    Platelet Count 148 (L) 150 - 450 10e9/L    Diff Method Automated Method     % Neutrophils 78.4 %    % Lymphocytes 11.9 %    % Monocytes 8.6 %    % Eosinophils 0.2 %    % Basophils 0.4 %    % Immature Granulocytes 0.5 %    Nucleated RBCs 0 0 /100    Absolute Neutrophil 9.7 (H) 1.6 - 8.3 10e9/L    Absolute Lymphocytes 1.5 0.8 - 5.3 10e9/L    Absolute Monocytes 1.1 0.0 - 1.3 10e9/L    Absolute Eosinophils 0.0 0.0 - 0.7 10e9/L    Absolute Basophils 0.1 0.0 - 0.2 10e9/L    Abs Immature Granulocytes 0.1 0 - 0.4 10e9/L    Absolute Nucleated RBC 0.0    Asymptomatic Influenza A/B & SARS-CoV2 (COVID-19) Virus PCR Multiplex    Collection Time: 03/09/21 11:26 AM    Specimen: Nasopharyngeal   Result Value Ref Range    Flu A/B & SARS-COV-2 PCR Source Nasopharyngeal     SARS-CoV-2 PCR Result NEGATIVE     Influenza A PCR Negative NEG^Negative    Influenza B PCR Negative NEG^Negative    Respiratory Syncytial Virus PCR (Note)     Flu A/B & SARS-CoV-2 PCR Comment (Note)    Drug abuse screen 6 urine (tox)    Collection Time: 03/09/21 12:49 PM   Result Value Ref Range    Amphetamine Qual Urine Positive (A) NEG^Negative    Barbiturates Qual Urine Negative NEG^Negative    Benzodiazepine Qual Urine Positive (A) NEG^Negative    Cannabinoids Qual Urine Negative NEG^Negative    Cocaine Qual Urine Positive (A) NEG^Negative    Ethanol Qual Urine Negative NEG^Negative    Opiates Qualitative Urine Negative NEG^Negative   Potassium    Collection Time: 03/10/21  6:50 AM   Result Value Ref Range    Potassium 3.8 3.4 - 5.3 mmol/L   GGT    Collection Time: 03/10/21  6:50 AM   Result Value Ref Range      (H) 0 - 75 U/L   CK total    Collection Time: 03/10/21  6:50 AM   Result Value Ref Range    CK Total 243 30 - 300 U/L   Lipase    Collection Time: 03/10/21  6:50 AM   Result Value Ref Range    Lipase 306 73 - 393 U/L   EKG 12-lead, complete    Collection Time: 03/10/21 12:52 PM   Result Value Ref Range    Interpretation ECG Click View Image link to view waveform and result    Magnesium    Collection Time: 03/11/21  7:34 AM   Result Value Ref Range    Magnesium 1.8 1.6 - 2.3 mg/dL   Calcium ionized whole blood    Collection Time: 03/11/21  7:34 AM   Result Value Ref Range    Calcium Ionized Whole Blood 4.7 4.4 - 5.2 mg/dL   WBC count    Collection Time: 03/11/21  7:34 AM   Result Value Ref Range    WBC 6.7 4.0 - 11.0 10e9/L   Platelet count    Collection Time: 03/11/21  7:34 AM   Result Value Ref Range    Platelet Count 141 (L) 150 - 450 10e9/L   Potassium    Collection Time: 03/11/21  7:34 AM   Result Value Ref Range    Potassium 4.1 3.4 - 5.3 mmol/L            Because this patient meets criteria for an Alcohol Use Disorder, I performed the following brief intervention on the date of this note:              1) Expressed concern that the patient is drinking at unhealthy levels known to increase their risk of alcohol related problems              2) Gave feedback linking alcohol use and health, including personalized feedback explaining how alcohol use can interact with their medical and/or psychiatric problems, and with prescribed medications.              3) Advised patient to abstain.    PT counseled on nicotine cessation and nicotine replacement provided    Discussed with patient many issues of addiction,triggers, relapse, and establishing a solid recovery program.    DISCHARGE MENTAL STATUS EXAMINATION:  The patient is alert, oriented x3.  Good fund of knowledge.  Good use of language.  Recent and remote memory, language, fund of knowledge are all adequate.  Euthymic mood congruent affect  Speech normal  rate/rhythm linear tp no loose asso,The patient does not have any active suicidal or homicidal ideation.  Does not have any auditory or visual hallucination.  Fair insight/judgment At this time, the patient was stable to be discharged.        Pt was not determined to not be a danger to himself or others. At the current time of discharge, the patient does not meet criteria for involuntary hospitalization. On the day of discharge, the patient reports that they do not have suicidal or homicidal ideation and would never hurt themselves or others. Steps taken to minimize risk include: assessing patient s behavior and thought process daily during hospital stay, discharging patient with adequate plan for follow up for mental and physical health and discussing safety plan of returning to the hospital should the patient ever have thoughts of harming themselves or others. Therefore, based on all available evidence including the factors cited above, the patient does not appear to be at imminent risk for self-harm, and is appropriate for outpatient level of care.     Educated about side effects/risk vs benefits /alternative including non treatment.Pt consented to be on medication.     .Total time spent on discharge summary more than 35 min  More than  20 min  planning, coordination of care, medication reconciliation and performance of physical exam on day of discharge.Care was coordinated with unit RN and unit therapist       Herb Summers   Home Medication Instructions ERA:[id number]    Printed on:03/12/21 1011   Medication Information                      losartan (COZAAR) 50 MG tablet  Take 1 tablet (50 mg) by mouth daily             pantoprazole (PROTONIX) 40 MG EC tablet  Take 1 tablet (40 mg) by mouth every morning (before breakfast)                Disposition: [facility]     Facts about COVID19 at www.cdc.gov/COVID19 and www.[place].gov/covid19     Keeping hands clean is one of the most important steps we can take to  "avoid getting sick and spreading germs to others.  Please wash your hands frequently and lather with soap for at least 20 seconds!     Medical Follow-Up:  Dr. Gardner  [facility]  [phone]  Monday, March 15, 2021 @ 1:45 pm.      Treatment Follow-Up:  [facility]  Admission: Friday March 12th at 12:45 pm  [address] [place] [address]  [phone]          \"Much or all of the text in this note was generated through the use of Dragon Dictate voice to text software. Errors in spelling or words which appear to be out of contact are unintentional, may be present due having escaped editing\"     "

## 2022-08-23 ENCOUNTER — VIRTUAL PHONE E/M (OUTPATIENT)
Dept: INTERNAL MEDICINE CLINIC | Facility: CLINIC | Age: 82
End: 2022-08-23
Payer: COMMERCIAL

## 2022-08-23 DIAGNOSIS — I48.0 PAF (PAROXYSMAL ATRIAL FIBRILLATION) (HCC): ICD-10-CM

## 2022-08-23 DIAGNOSIS — I10 PRIMARY HYPERTENSION: ICD-10-CM

## 2022-08-23 DIAGNOSIS — E78.2 MIXED HYPERLIPIDEMIA: ICD-10-CM

## 2022-08-23 DIAGNOSIS — I73.9 PAD (PERIPHERAL ARTERY DISEASE) (HCC): ICD-10-CM

## 2022-08-23 DIAGNOSIS — I25.10 CORONARY ARTERY DISEASE INVOLVING NATIVE CORONARY ARTERY OF NATIVE HEART WITHOUT ANGINA PECTORIS: ICD-10-CM

## 2022-08-23 DIAGNOSIS — I73.9 CLAUDICATION (HCC): ICD-10-CM

## 2022-08-23 DIAGNOSIS — I46.9 ASYSTOLE (HCC): Primary | ICD-10-CM

## 2022-08-23 DIAGNOSIS — Z95.0 PACEMAKER: ICD-10-CM

## 2022-08-23 PROBLEM — K27.9 PEPTIC ULCER DISEASE: Status: RESOLVED | Noted: 2021-06-23 | Resolved: 2022-08-23

## 2022-08-23 PROBLEM — Z87.19 HISTORY OF GI BLEED: Status: ACTIVE | Noted: 2022-08-23

## 2022-08-23 PROBLEM — K92.2 UGIB (UPPER GASTROINTESTINAL BLEED): Status: RESOLVED | Noted: 2021-06-05 | Resolved: 2022-08-23

## 2022-08-23 PROCEDURE — 1111F DSCHRG MED/CURRENT MED MERGE: CPT | Performed by: INTERNAL MEDICINE

## 2022-08-23 PROCEDURE — 99214 OFFICE O/P EST MOD 30 MIN: CPT | Performed by: INTERNAL MEDICINE

## 2022-08-23 NOTE — PATIENT INSTRUCTIONS
Please bring your machine into your next office visit to ensure it is accurate. I like the [employer] brand. Please keep the receipt. Please measure your blood pressure and pulse daily and record these values. Please measure your blood pressure two times in the morning and two times in the evening (1 minute apart) after when you have been relaxing for at least 5 minutes. Both feet should be flat on the ground and you should be seated. Please share these blood pressures with me in 14 days. Please call us if your blood pressure is greater than 130/80 on 3 occasions.

## 2022-08-25 ENCOUNTER — TELEPHONE (OUTPATIENT)
Dept: INTERNAL MEDICINE CLINIC | Facility: CLINIC | Age: 82
End: 2022-08-25

## 2022-08-25 NOTE — TELEPHONE ENCOUNTER
CARLOSI:  Pt called back. She stated she does have an appt set up with her cardiologist on sept 6th. She also wanted to let Dr. Deandra Henry know that she has been charting her BP readings.

## 2022-08-25 NOTE — TELEPHONE ENCOUNTER
Pt wanted to let Dr Shakira Wild know that she was seen at [facility] ER on 8/23 for chest pain. Per pt she just wanted to make Dr Shakria Wild aware and does not need a call back.      8/23 visit notes in [facility]     Rigo Best

## 2022-08-25 NOTE — TELEPHONE ENCOUNTER
I saw that. She really needs an office visit with her cardiologist Iman Lucas) who is also aware of her recent hospitalizations. If she is unable to see him within 1-2 weeks, I will add her onto my schedule. But it must be in person, not virtual because I need to check her blood pressure.

## 2022-09-16 ENCOUNTER — OFFICE VISIT (OUTPATIENT)
Dept: INTERNAL MEDICINE CLINIC | Facility: CLINIC | Age: 82
End: 2022-09-16
Payer: COMMERCIAL

## 2022-09-16 VITALS
HEART RATE: 84 BPM | RESPIRATION RATE: 16 BRPM | DIASTOLIC BLOOD PRESSURE: 80 MMHG | BODY MASS INDEX: 28.69 KG/M2 | SYSTOLIC BLOOD PRESSURE: 120 MMHG | OXYGEN SATURATION: 98 % | TEMPERATURE: 98 F | WEIGHT: 172.19 LBS | HEIGHT: 65 IN

## 2022-09-16 DIAGNOSIS — Z95.0 PACEMAKER: ICD-10-CM

## 2022-09-16 DIAGNOSIS — I46.9 ASYSTOLE (HCC): ICD-10-CM

## 2022-09-16 DIAGNOSIS — I10 PRIMARY HYPERTENSION: ICD-10-CM

## 2022-09-16 DIAGNOSIS — I25.10 CORONARY ARTERY DISEASE INVOLVING NATIVE CORONARY ARTERY OF NATIVE HEART WITHOUT ANGINA PECTORIS: Primary | ICD-10-CM

## 2022-09-16 DIAGNOSIS — E78.2 MIXED HYPERLIPIDEMIA: ICD-10-CM

## 2022-09-16 PROCEDURE — 3008F BODY MASS INDEX DOCD: CPT | Performed by: INTERNAL MEDICINE

## 2022-09-16 PROCEDURE — 3074F SYST BP LT 130 MM HG: CPT | Performed by: INTERNAL MEDICINE

## 2022-09-16 PROCEDURE — 3079F DIAST BP 80-89 MM HG: CPT | Performed by: INTERNAL MEDICINE

## 2022-09-16 PROCEDURE — 99214 OFFICE O/P EST MOD 30 MIN: CPT | Performed by: INTERNAL MEDICINE

## 2022-09-16 RX ORDER — HYDROXYZINE HYDROCHLORIDE 10 MG/1
10 TABLET, FILM COATED ORAL 3 TIMES DAILY PRN
Qty: 30 TABLET | Refills: 0 | Status: SHIPPED | OUTPATIENT
Start: 2022-09-16

## 2022-09-16 RX ORDER — VALSARTAN AND HYDROCHLOROTHIAZIDE 160; 12.5 MG/1; MG/1
1 TABLET, FILM COATED ORAL DAILY
COMMUNITY
End: 2022-09-19

## 2022-09-16 RX ORDER — AMLODIPINE BESYLATE 5 MG/1
5 TABLET ORAL DAILY
COMMUNITY
Start: 2022-09-06

## 2022-09-16 NOTE — PATIENT INSTRUCTIONS
Please try the hydroxyzine (as needed for anxiety). If the 10 mg dose is not effective, you can use 20 mg (2 tablets). If this medication is not helping, then I recommend we try effexor (venlafaxine) as a daily medication to help you feel better for the next 3-6 months.

## 2022-09-19 ENCOUNTER — TELEPHONE (OUTPATIENT)
Dept: INTERNAL MEDICINE CLINIC | Facility: CLINIC | Age: 82
End: 2022-09-19

## 2022-09-19 RX ORDER — VALSARTAN AND HYDROCHLOROTHIAZIDE 160; 12.5 MG/1; MG/1
1 TABLET, FILM COATED ORAL DAILY
Refills: 0 | COMMUNITY
Start: 2022-09-19

## 2022-09-19 NOTE — TELEPHONE ENCOUNTER
I have updated her medication list. Please tell patient she needs to inform Dr. Arely Bishop (her cardiologist) of this when she sees him next. I advise her to take in all her medication bottles to the office visit.

## 2022-09-19 NOTE — TELEPHONE ENCOUNTER
Pt called stating during her OV with  she was asked to cb and inform us if she is still taking Valsartan, pt states she is taking valsartan-hydroCHLOROthiazide 160-12.5 MG Oral Tab      FYI

## 2022-12-19 ENCOUNTER — TELEMEDICINE (OUTPATIENT)
Dept: INTERNAL MEDICINE CLINIC | Facility: CLINIC | Age: 82
End: 2022-12-19
Payer: COMMERCIAL

## 2022-12-19 VITALS — HEART RATE: 105 BPM | SYSTOLIC BLOOD PRESSURE: 119 MMHG | DIASTOLIC BLOOD PRESSURE: 91 MMHG | TEMPERATURE: 98 F

## 2022-12-19 DIAGNOSIS — I48.0 PAF (PAROXYSMAL ATRIAL FIBRILLATION) (HCC): ICD-10-CM

## 2022-12-19 DIAGNOSIS — R06.2 WHEEZING: ICD-10-CM

## 2022-12-19 DIAGNOSIS — E78.2 MIXED HYPERLIPIDEMIA: ICD-10-CM

## 2022-12-19 DIAGNOSIS — R05.1 ACUTE COUGH: Primary | ICD-10-CM

## 2022-12-19 DIAGNOSIS — I25.10 CORONARY ARTERY DISEASE INVOLVING NATIVE CORONARY ARTERY OF NATIVE HEART WITHOUT ANGINA PECTORIS: ICD-10-CM

## 2022-12-19 DIAGNOSIS — R07.89 CHEST TIGHTNESS: ICD-10-CM

## 2022-12-19 DIAGNOSIS — I10 PRIMARY HYPERTENSION: ICD-10-CM

## 2022-12-19 PROBLEM — Z87.19 HISTORY OF GI BLEED: Status: RESOLVED | Noted: 2022-08-23 | Resolved: 2022-12-19

## 2022-12-19 RX ORDER — AMOXICILLIN AND CLAVULANATE POTASSIUM 875; 125 MG/1; MG/1
1 TABLET, FILM COATED ORAL 2 TIMES DAILY
Qty: 14 TABLET | Refills: 0 | Status: SHIPPED | OUTPATIENT
Start: 2022-12-19 | End: 2022-12-26

## 2022-12-19 RX ORDER — ALBUTEROL SULFATE 90 UG/1
2 AEROSOL, METERED RESPIRATORY (INHALATION) EVERY 4 HOURS PRN
Qty: 18 G | Refills: 0 | Status: SHIPPED | OUTPATIENT
Start: 2022-12-19

## 2022-12-19 RX ORDER — DEXTROMETHORPHAN HYDROBROMIDE AND PROMETHAZINE HYDROCHLORIDE 15; 6.25 MG/5ML; MG/5ML
5 SOLUTION ORAL NIGHTLY PRN
Qty: 118 ML | Refills: 0 | Status: SHIPPED | OUTPATIENT
Start: 2022-12-19 | End: 2023-01-02

## 2022-12-19 RX ORDER — BENZONATATE 200 MG/1
200 CAPSULE ORAL 3 TIMES DAILY PRN
Qty: 30 CAPSULE | Refills: 0 | Status: SHIPPED | OUTPATIENT
Start: 2022-12-19

## 2023-01-26 ENCOUNTER — TELEPHONE (OUTPATIENT)
Dept: INTERNAL MEDICINE CLINIC | Facility: CLINIC | Age: 83
End: 2023-01-26

## 2023-01-26 NOTE — TELEPHONE ENCOUNTER
KS - would you like to try to squeeze pt in tomorrow somewhere? Please advise, ty    Spoke to pt, she has chills but no fever, body aches, head congestion, no appetite. Sore throat started Sunday, COVID home test + today. All providers full tomorrow. Pt does not have MyChart set up for on demand video visit request. Pt does not want to go to IC or ER due to having been in the hospital so much this past year. Pt's blood pressure and pulse today were:    9 AM - 150/80, 111  2 PM - 140/76, 109    Explained how Paxlovid must be prescribed. Gave pt the [facility] main # to see if she can get a phone visit scheduled with a provider for today/tonight.  is at work, he has no symptoms yet, will have him test tonight when he gets home.

## 2023-01-26 NOTE — TELEPHONE ENCOUNTER
Pt called stating she took a positive covid test today. Pt c/o chills, head congestion, and a slight cough. Pt looking for recs, pt doesn't know if she needs an appt or not, she would like to discuss what she needs to do.

## 2023-01-27 NOTE — TELEPHONE ENCOUNTER
HPI:  She returned from [place] on Jan 21st. The following day she developed a scratchy throat. Symptoms progressed to fatigue and body aches. Her blood pressures have also been a bit high (noted in RN notes). She is afebrile. She is experiencing chills and head congestion. REVIEW OF SYSTEMS:   GENERAL HEALTH: fsee HPI  NEURO: denies any headaches, LH, dizzyness, LOC, falls. She feels weak  VISION: denies any blurred or double vision  RESPIRATORY: denies shortness of breath, chest tightness. Has a productive cough but is is mild. Denies runny nose or ear pain. Denies wheezing. CARDIOVASCULAR: denies chest pain, pressure or palpitations  GI: denies abdominal pain, constipation, diarrhea, n/v. Appetite is poor. : no dysuria or hematuria  SKIN: denies any unusual skin lesions or rashes  EXT: denies edema     Assessment:  COVID infection    Plan: treat with molnupirivir for 5 days. I reviewed risks, side effects, and need to watch for bleeding as she is on eliquis and plavix. She understands.

## 2023-05-09 ENCOUNTER — VIRTUAL PHONE E/M (OUTPATIENT)
Dept: INTERNAL MEDICINE CLINIC | Facility: CLINIC | Age: 83
End: 2023-05-09
Payer: COMMERCIAL

## 2023-05-09 DIAGNOSIS — U07.1 COVID-19: Primary | ICD-10-CM

## 2023-05-09 DIAGNOSIS — I10 PRIMARY HYPERTENSION: ICD-10-CM

## 2023-05-09 PROCEDURE — 99213 OFFICE O/P EST LOW 20 MIN: CPT | Performed by: INTERNAL MEDICINE

## 2023-05-09 RX ORDER — METOPROLOL TARTRATE 100 MG/1
100 TABLET ORAL DAILY
Qty: 90 TABLET | Refills: 0 | COMMUNITY
Start: 2023-05-09

## 2023-05-09 NOTE — PROGRESS NOTES
Virtual Telephone Check-In    This visit is conducted using Telemedicine with live, interactive video and audio. Patient resides in [place]   Patient understands and accepts financial responsibility for any deductible, co-insurance and/or co-pays associated with this service. Telehealth outside of [employer] Consent   I conducted a telehealth visit with [address] on   which was completed using two-way, real-time interactive audio  communication. This has been done in good sara to provide continuity of care in the best interest of the provider-patient relationship, due to the COVID -19 public health crisis/national emergency where restrictions of face-to-face office visits are ongoing. Every conscious effort was taken to allow for sufficient and adequate time to complete the visit. The patient was made aware of the limitations of the telehealth visit, including treatment limitations as no physical exam could be performed. The patient was advised to call 911 or to go to the ER in case there was an emergency. The patient was also advised of the potential privacy & security concerns related to the telehealth platform. The patient was made aware of where to find [facility] notice of privacy practices, telehealth consent form and other related consent forms and documents. which are located on the [facility] website. The patient verbally agreed to telehealth consent form, related consents and the risks discussed. Lastly, the patient confirmed that they were in [place]. Included in this visit, time may have been spent reviewing labs, medications, radiology tests and decision making. Appropriate medical decision-making and tests are ordered as detailed in the plan of care above. Coding/billing information is submitted for this visit based on complexity of care and/or time spent for the visit.   Time spent: 20 minutes with chart review and speaking with the patient  HPI: Denver Byers is an 79 year old female who tested for covid today. Has mild symptoms such as sinus congestion. No fevers. Her  tested positive last week and is feeling weak. She just received a covid booster about 1 week ago. She had covid 5 months ago, but the symptoms are less severe today. Wondering if she needs to be on a medication or okay to continue with symptomatic treatment  Hypertension: blood pressure medication was recently adjusted as well   ROS:  General: Feels well overall  Skin: Denies any unusual skin lesions  Eyes: Denies blurred vision or double vision  All systems negative, except for above    Assessment and Plan  (U07.1) COVID-19  (primary encounter diagnosis)  Plan: will hold off on paxlovid especially patient is on an anticoagulant. Continue with tylenol and mucinex.  Follow up in 7 to 10 days or sooner if symptoms do not improve or worsen     (I10) Primary hypertension  Plan: metoprolol tartrate 100 MG Oral Tab  Note: medication updated  Follow up in 7 to 10 days or sooner if symptoms do not improve or worsen   Cira Hunt DO

## 2023-05-12 ENCOUNTER — TELEPHONE (OUTPATIENT)
Dept: INTERNAL MEDICINE CLINIC | Facility: CLINIC | Age: 83
End: 2023-05-12

## 2023-05-12 NOTE — TELEPHONE ENCOUNTER
Patient is covid positive and would like to know how many days should she quarantine. Please advise.

## 2023-05-12 NOTE — TELEPHONE ENCOUNTER
Spoke to pt who states she tested positive for Covid on Monday 5/8/23. Explained to pt that day is considered day zero, so she should quarantine through all of tomorrow. Starting Sunday, pt can go out in the community while wearing a mask for an additional 5 days. Beginning next Friday, 5/19/23, as long as her symptoms have been continuing to improve, pt can return to regular routine with no mask. Pt stated her  Sander Cason tested positive for Covid on Friday 5/5/23. Their son has been \"overboard\" and pt just wanted to get clarification. RN recommended to pt that if they all of a sudden begin to have worsening symptoms, they should return to IC or ER as they can have other illnesses at the same time as Covid. Pt v/u.

## 2023-06-01 ENCOUNTER — HOSPITAL ENCOUNTER (OUTPATIENT)
Dept: LAB | Facility: HOSPITAL | Age: 83
Discharge: HOME | End: 2023-06-01
Attending: INTERNAL MEDICINE
Payer: MEDICARE

## 2023-06-01 LAB
ALBUMIN SERPL-MCNC: 3.6 G/DL (ref 3.4–5)
ALBUMIN/GLOB SERPL: 0.9 {RATIO} (ref 1–2)
ALP LIVER SERPL-CCNC: 61 U/L
ALT SERPL-CCNC: 29 U/L
ANION GAP SERPL CALC-SCNC: 4 MMOL/L (ref 0–18)
AST SERPL-CCNC: 30 U/L (ref 15–37)
BILIRUB SERPL-MCNC: 0.6 MG/DL (ref 0.1–2)
BUN BLD-MCNC: 25 MG/DL (ref 7–18)
CALCIUM BLD-MCNC: 9.3 MG/DL (ref 8.5–10.1)
CHLORIDE SERPL-SCNC: 102 MMOL/L (ref 98–112)
CHOLEST SERPL-MCNC: 136 MG/DL (ref ?–200)
CO2 SERPL-SCNC: 29 MMOL/L (ref 21–32)
CREAT BLD-MCNC: 0.92 MG/DL
FASTING PATIENT LIPID ANSWER: YES
FASTING STATUS PATIENT QL REPORTED: YES
GFR SERPLBLD BASED ON 1.73 SQ M-ARVRAT: 61 ML/MIN/1.73M2 (ref 60–?)
GLOBULIN PLAS-MCNC: 3.9 G/DL (ref 2.8–4.4)
GLUCOSE BLD-MCNC: 100 MG/DL (ref 70–99)
HDLC SERPL-MCNC: 68 MG/DL (ref 40–59)
LDLC SERPL CALC-MCNC: 48 MG/DL (ref ?–100)
NONHDLC SERPL-MCNC: 68 MG/DL (ref ?–130)
OSMOLALITY SERPL CALC.SUM OF ELEC: 284 MOSM/KG (ref 275–295)
POTASSIUM SERPL-SCNC: 4 MMOL/L (ref 3.5–5.1)
PROT SERPL-MCNC: 7.5 G/DL (ref 6.4–8.2)
SODIUM SERPL-SCNC: 135 MMOL/L (ref 136–145)
TRIGL SERPL-MCNC: 115 MG/DL (ref 30–149)
VLDLC SERPL CALC-MCNC: 16 MG/DL (ref 0–30)

## 2023-06-01 PROCEDURE — 80061 LIPID PANEL: CPT | Performed by: INTERNAL MEDICINE

## 2023-06-01 PROCEDURE — 36415 COLL VENOUS BLD VENIPUNCTURE: CPT | Performed by: INTERNAL MEDICINE

## 2023-06-01 PROCEDURE — 80053 COMPREHEN METABOLIC PANEL: CPT | Performed by: INTERNAL MEDICINE

## 2023-08-18 ENCOUNTER — OFFICE VISIT (OUTPATIENT)
Dept: FAMILY MEDICINE CLINIC | Facility: CLINIC | Age: 83
End: 2023-08-18
Payer: COMMERCIAL

## 2023-08-18 VITALS
SYSTOLIC BLOOD PRESSURE: 138 MMHG | DIASTOLIC BLOOD PRESSURE: 68 MMHG | OXYGEN SATURATION: 99 % | BODY MASS INDEX: 28.32 KG/M2 | WEIGHT: 170 LBS | TEMPERATURE: 99 F | HEART RATE: 62 BPM | RESPIRATION RATE: 12 BRPM | HEIGHT: 65 IN

## 2023-08-18 DIAGNOSIS — M79.18 BUTTOCK PAIN: ICD-10-CM

## 2023-08-18 DIAGNOSIS — T14.8XXA MUSCLE STRAIN: Primary | ICD-10-CM

## 2023-08-18 RX ORDER — CYCLOBENZAPRINE HCL 5 MG
5 TABLET ORAL NIGHTLY
Qty: 10 TABLET | Refills: 0 | Status: SHIPPED | OUTPATIENT
Start: 2023-08-18 | End: 2023-08-28

## 2023-08-18 RX ORDER — SOTALOL HYDROCHLORIDE 80 MG/1
80 TABLET ORAL 2 TIMES DAILY
COMMUNITY

## 2023-08-18 NOTE — PATIENT INSTRUCTIONS
Muscle relaxer at night   Moist heat to area   Voltaren cream OTC   Gentle stretching   Monitor for skin redness, swelling, or rash- recheck if occurs   Please follow up with PCP if no improvement or if symptoms worsen

## 2023-10-25 ENCOUNTER — OFFICE VISIT (OUTPATIENT)
Dept: INTERNAL MEDICINE CLINIC | Facility: CLINIC | Age: 83
End: 2023-10-25

## 2023-10-25 VITALS
SYSTOLIC BLOOD PRESSURE: 142 MMHG | HEIGHT: 65 IN | DIASTOLIC BLOOD PRESSURE: 80 MMHG | WEIGHT: 167 LBS | HEART RATE: 76 BPM | OXYGEN SATURATION: 98 % | RESPIRATION RATE: 16 BRPM | TEMPERATURE: 98 F | BODY MASS INDEX: 27.82 KG/M2

## 2023-10-25 DIAGNOSIS — I10 PRIMARY HYPERTENSION: ICD-10-CM

## 2023-10-25 DIAGNOSIS — M85.89 OSTEOPENIA OF MULTIPLE SITES: ICD-10-CM

## 2023-10-25 DIAGNOSIS — I25.10 CORONARY ARTERY DISEASE INVOLVING NATIVE CORONARY ARTERY OF NATIVE HEART WITHOUT ANGINA PECTORIS: ICD-10-CM

## 2023-10-25 DIAGNOSIS — Z80.0 FAMILY HISTORY OF COLON CANCER: ICD-10-CM

## 2023-10-25 DIAGNOSIS — E78.2 MIXED HYPERLIPIDEMIA: ICD-10-CM

## 2023-10-25 DIAGNOSIS — E66.3 OVERWEIGHT (BMI 25.0-29.9): ICD-10-CM

## 2023-10-25 DIAGNOSIS — Z00.00 ROUTINE GENERAL MEDICAL EXAMINATION AT A HEALTH CARE FACILITY: Primary | ICD-10-CM

## 2023-10-25 DIAGNOSIS — I73.9 PAD (PERIPHERAL ARTERY DISEASE) (HCC): ICD-10-CM

## 2023-10-25 DIAGNOSIS — I48.0 PAF (PAROXYSMAL ATRIAL FIBRILLATION) (HCC): ICD-10-CM

## 2023-10-25 DIAGNOSIS — Z86.74 HISTORY OF CARDIAC ARREST: ICD-10-CM

## 2023-10-25 PROBLEM — I46.9 ASYSTOLE (HCC): Status: RESOLVED | Noted: 2022-08-23 | Resolved: 2023-10-25

## 2023-10-25 PROBLEM — J41.0 SMOKERS' COUGH (HCC): Chronic | Status: ACTIVE | Noted: 2023-10-25

## 2023-10-25 RX ORDER — SODIUM FLUORIDE 1.1 G/100G
1 CREAM ORAL
COMMUNITY
Start: 2023-10-03

## 2023-10-25 RX ORDER — VALACYCLOVIR HYDROCHLORIDE 1 G/1
2000 TABLET, FILM COATED ORAL EVERY 12 HOURS
COMMUNITY
Start: 2023-09-26 | End: 2023-10-25 | Stop reason: ALTCHOICE

## 2023-10-25 RX ORDER — METOPROLOL SUCCINATE 100 MG/1
100 TABLET, EXTENDED RELEASE ORAL DAILY
COMMUNITY
Start: 2023-05-23 | End: 2023-10-25

## 2023-10-25 NOTE — PATIENT INSTRUCTIONS
Please complete your fasting labs in December,2024. Before a meal that is going to aggravate your stomach, you can take over the counter famotidine (pepcid) 20 mg as needed.

## 2023-10-27 LAB
ABSOLUTE BASOPHILS: 0.1
ABSOLUTE EOSINOPHILS: 0.4
ABSOLUTE LYMPHOCYTES: 3.4
ABSOLUTE MONOCYTES: 0.9
ABSOLUTE NEUTROPHILS: 4.8
ALT: 19
ANION GAP: 5
AST: 25
BASOPHILS: 1
BUN: 25
CALCIUM: 9.6
CARBON DIOXIDE: 31
CHLORIDE: 100
CREATININE: 0.96 MG/DL
EOSINOPHILS: 3.9
GFR (CKD-EPI)CORRECTED: 58
GLUCOSE: 94
HCT: 40.7
HDL CHOLESTEROL: 58 MG/DL
HGB: 13.4
IMMATURE GRANULOCYTE ABSOLUTE COUNT: 0 10^3/ΜL
IMMATURE GRANULOCYTES: 0.4
LDL CHOLESTEROL: 56 MG/DL (ref ?–130)
LYMPHOCYTES: 35.7
MEAN CELL VOLUME: 91.5
MEAN CORPUSCULAR HEMOGLOBIN: 30.1
MEAN CORPUSCULAR HGB CONC: 32.9
MONOCYTES: 9.3 %
NEUTROPHILS: 49.7 %
NON-HDL CHOLESTEROL: 73
PLT: 186
POTASSIUM: 4.6
RED BLOOD COUNT: 4.45
RED CELL DISTRIBUTION WIDTH: 14.6
SODIUM: 136
TOTAL CHOLESTEROL: 131 MG/DL (ref ?–200)
TRIGLYCERIDES: 86 MG/DL
WBC: 9.6

## 2023-10-31 ENCOUNTER — TELEPHONE (OUTPATIENT)
Dept: INTERNAL MEDICINE CLINIC | Facility: CLINIC | Age: 83
End: 2023-10-31

## 2024-02-20 ENCOUNTER — TELEPHONE (OUTPATIENT)
Dept: INTERNAL MEDICINE CLINIC | Facility: CLINIC | Age: 84
End: 2024-02-20

## 2024-02-20 DIAGNOSIS — R19.7 DIARRHEA, UNSPECIFIED TYPE: Primary | ICD-10-CM

## 2024-02-21 NOTE — TELEPHONE ENCOUNTER
Patient paged stating she is having diarrhea. She was hospitalized 2 weeks ago for a bleeding ulcer. Was started on pantoprazole. Vomiting has resolved, but diarrhea  persists. Recommended to start pedialyte and check for C diff. Avoid Imodium till c diff is ruled out. Discussed the red flags that will prompt an ER visit   Will forward to primary care physician   Cira Kerr DO     No

## 2024-02-21 NOTE — TELEPHONE ENCOUNTER
Pt scheduled.    FYI     During call pt stated she doesn't know what to do with ongoing diarrhea.. I reiterated the recommendations from Dr. Kerr, pt stated she will not go to ER because she was just there. I assured pt if there is a cancellation today, I will give her a call back.    Future Appointments   Date Time Provider Department Center   2/28/2024 11:30 AM Meka Parra MD [facility] [place]

## 2024-02-28 ENCOUNTER — OFFICE VISIT (OUTPATIENT)
Dept: INTERNAL MEDICINE CLINIC | Facility: CLINIC | Age: 84
End: 2024-02-28
Payer: COMMERCIAL

## 2024-02-28 VITALS
WEIGHT: 166 LBS | OXYGEN SATURATION: 96 % | TEMPERATURE: 99 F | BODY MASS INDEX: 27.66 KG/M2 | RESPIRATION RATE: 16 BRPM | HEART RATE: 76 BPM | SYSTOLIC BLOOD PRESSURE: 124 MMHG | DIASTOLIC BLOOD PRESSURE: 78 MMHG | HEIGHT: 65 IN

## 2024-02-28 DIAGNOSIS — M85.89 OSTEOPENIA OF MULTIPLE SITES: ICD-10-CM

## 2024-02-28 DIAGNOSIS — E78.2 MIXED HYPERLIPIDEMIA: ICD-10-CM

## 2024-02-28 DIAGNOSIS — Z00.00 ROUTINE GENERAL MEDICAL EXAMINATION AT A HEALTH CARE FACILITY: Primary | ICD-10-CM

## 2024-02-28 DIAGNOSIS — I48.0 PAF (PAROXYSMAL ATRIAL FIBRILLATION) (HCC): ICD-10-CM

## 2024-02-28 DIAGNOSIS — E66.3 OVERWEIGHT (BMI 25.0-29.9): ICD-10-CM

## 2024-02-28 DIAGNOSIS — I25.10 CORONARY ARTERY DISEASE INVOLVING NATIVE CORONARY ARTERY OF NATIVE HEART WITHOUT ANGINA PECTORIS: ICD-10-CM

## 2024-02-28 DIAGNOSIS — Z86.74 HISTORY OF CARDIAC ARREST: ICD-10-CM

## 2024-02-28 DIAGNOSIS — I73.9 PAD (PERIPHERAL ARTERY DISEASE) (HCC): ICD-10-CM

## 2024-02-28 DIAGNOSIS — I10 PRIMARY HYPERTENSION: ICD-10-CM

## 2024-02-28 DIAGNOSIS — K25.4 GASTROINTESTINAL HEMORRHAGE ASSOCIATED WITH GASTRIC ULCER: ICD-10-CM

## 2024-02-28 PROBLEM — J41.0 SMOKERS' COUGH (HCC): Chronic | Status: RESOLVED | Noted: 2023-10-25 | Resolved: 2024-02-28

## 2024-02-28 RX ORDER — MELATONIN
1000 DAILY
COMMUNITY

## 2024-02-28 RX ORDER — VALACYCLOVIR HYDROCHLORIDE 1 G/1
2000 TABLET, FILM COATED ORAL EVERY 12 HOURS
COMMUNITY
Start: 2024-01-16 | End: 2024-02-28

## 2024-02-28 RX ORDER — PANTOPRAZOLE SODIUM 40 MG/1
40 TABLET, DELAYED RELEASE ORAL
COMMUNITY
Start: 2024-03-03 | End: 2024-02-28

## 2024-02-28 RX ORDER — PANTOPRAZOLE SODIUM 40 MG/1
40 TABLET, DELAYED RELEASE ORAL
Qty: 90 TABLET | Refills: 1 | Status: SHIPPED | OUTPATIENT
Start: 2024-03-03

## 2024-02-28 NOTE — PROGRESS NOTES
Angelique Castle is a 84 year old female.     HPI:   Patient presents for MAS and additional issues noted below and hospital follow-up.  GIB - she presented to OSH with dark stools x 2 days and weakness.  S/p EGD, patient with gastric ulcer, with red spot, that was successfully cauterized. Gastric biopsies sent. No Eliquis for 2 weeks. Start Protonix 40 mg BID for next 4 weeks, and then daily thereafter for GI Px. Pathology: No H. Pylori, was identified. Patient medically stable upon discharge home. Was instructed to f/u with GI Dr. Bowers. She has not done this yet.   Diarrhea - started after hospitalization around 2/11. She was unable to provide stool sample for testing. She also vomitted for 2.5 days.  had similar symptoms. Vomiting preceded her diarrhea. Loose, watery stools. No blood or melena. Symptoms resolved 1 week ago. She is tired but tolerating oral intake.she is tired but she was able to drive herself here.   HTN -s he has not checked her pressures.   Anticoagulation for afib - permanently holding her plavix now. She has resumed apixaban 4 days ago.   PADz - she does not want intervention. No worsening symptoms.     REVIEW OF SYSTEMS:   GENERAL HEALTH: feels well otherwise. No f/c  NEURO: denies any headaches, LOC, falls. Occ LH and dizziness.   VISION: denies any blurred or double vision  RESPIRATORY: denies shortness of breath, cough, or congestion  CARDIOVASCULAR: denies chest pain, pressure or palpitations  GI: see HPI  : no dysuria  or hematuria or vaginal bleeding  SKIN: denies any unusual skin lesions or rashes  PSYCH: mood is stable. Denies depression or anxiety.   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00     Total pack years: 30.00     Types: Cigarettes     Quit date: 1980     Years since quittin.1    Smokeless tobacco: Never   Vaping Use    Vaping Use: Never used   Substance and Sexual Activity    Alcohol use: No     Alcohol/week: 0.0 standard drinks of alcohol    Drug use: No    Sexual activity: Yes   Other Topics Concern    Caffeine Concern Yes     Comment: 1 cup coffee per day     Exercise Yes     Comment: 3x/week.           EXAM:   /78   Pulse 76   Temp 98.5 °F (36.9 °C) (Temporal)   Resp 16   Ht 5' 5\" (1.651 m)   Wt 166 lb (75.3 kg)   SpO2 96%   BMI 27.62 kg/m²   GENERAL: A&O well developed, well nourished,in no apparent distress  SKIN: no rashes,no suspicious lesions  HEENT: atraumatic, MMM, throat is clear  NECK: supple, no jvd, no thyromegaly  LUNGS: clear to auscultation bilateraly, no c/w/r  CARDIO: RRR without g/m/r  GI: soft non tender nondistended no hsm bs throughout  NEURO: CN 2-12 grossly intact  PSYCH: pleasant  EXTREMITIES: no cyanosis, clubbing or edema      ASSESSMENT AND PLAN:   # GIB due to gastric ulcer - s/p  cauterization on 2/9/2024. She has resumed her eliquis. She can start iron. Repeat CBC in 2 weeks. Cont PPI BID until March 9th then change to daily PPI. She needs to f/u with GI, referral entered.   # History of Asystole : underwent PPM on 8/22/2022. Following with cardiology.   # Claudication 2/2 RLE PADz: remains asymptomatic.She has progressive claudication with right iliofemoral arterial occlusive disease s/p staged procedure of her left main ostial stenosis. She wants to hold off on right iliofemoral endarterectomy and vein patch for now.  # CADz s/p CABG, HLP, s/p PCI on 8/15/2022: doing well on statin. Hold plavix and ASA due to gastric ulcers.   # Carotid Artery DIsease: Carotid duplex on 12/27/2019:  Continue risk factor modification, statin  # HTN with white coat syndrome :cont norvasc and valsartan-hydrochlorothiazide.  # Paroxysmal Atrial Fibrillation:  rate controlled with sotalol and anti-coagulation with eliquis.   # Overweight, BMI 27: weight is stable. She is very healthy and active.   # Health Maintenance: medicare wellness exam on 2/28/2024. Does not want screenings.   Bone Health: on calcium/vit D, already doing weight bearing exercises. Normal DEXA in 2005 and she does not want to repeat.  Vaccines: Shingles 2013, Pneumovac 2013. Prevnar 13 in 2016. TDAP in 2017. shingrix 2018. Cont COVID, flu and RSV  Healthcare Living Will, Medical POA:   first. Would choose son, Jude Gonzalez otherwise. Goals of care - full code but no prolonged life support     Care Team:  SANFORD De Guzman out of [place]      The patient indicates understanding of these issues and agrees to the plan.  The patient is asked to return in 1 year for MAS.  Meka Parra MD

## 2024-02-28 NOTE — PATIENT INSTRUCTIONS
Please continue pantoprazole twice daily until March 8th and then take it once daily until you see the gastroenterologist.    Please repeat your labs in 2 weeks. You do not need to fast.    Please start over the counter ferrous sulfate 325 mg once daily. Iron replacement can cause constipation so it is important you practice a high fiber diet (5 fruits/vegetables daily), drink lots of water, and exercise regularly. If you are constipated despite these measures it is safe to use over the counter STOOL SOFTNERS. Avoid laxatives. I recommend miralax and docusate.

## 2024-03-12 ENCOUNTER — TELEPHONE (OUTPATIENT)
Dept: INTERNAL MEDICINE CLINIC | Facility: CLINIC | Age: 84
End: 2024-03-12

## 2024-03-12 NOTE — TELEPHONE ENCOUNTER
I received lab results from [facility] done on 3/11/2024. Her anemia has significantly improved. Her iron, folic acid, and B12 levels are normal. She should continue whichever supplements she is already taking.

## 2024-03-13 LAB
ABSOLUTE BASOPHILS: 0.1
ABSOLUTE EOSINOPHILS: 0.3
ABSOLUTE LYMPHOCYTES: 3.9
ABSOLUTE NEUTROPHILS: 4.5
BASOPHIL %: 1.1
EOSINOPHIL %: 2.7
HCT: 34.9
HGB: 11.1
IMMATURE GRANULOCYTES: 0.3
LYMPHOCYTE %: 40.1
MEAN CELL VOLUME: 93.6
MEAN CORPUSCULAR HEMOGLOBIN: 29.8
MEAN CORPUSCULAR HGB CONC: 31.8
MONOCYTES: 10.1 %
NEUTROPHILS: 45.7 %
PLT: 219
RED BLOOD COUNT: 3.73
RED CELL DISTRIBUTION WIDTH: 15.3
WBC: 9.7

## 2024-03-13 NOTE — TELEPHONE ENCOUNTER
Patient provided with test results and MD instructions listed below. She verbalized understanding.

## 2024-05-28 RX ORDER — PANTOPRAZOLE SODIUM 40 MG/1
TABLET, DELAYED RELEASE ORAL
Qty: 180 TABLET | Refills: 0 | Status: SHIPPED | OUTPATIENT
Start: 2024-05-28 | End: 2024-05-29

## 2024-05-29 RX ORDER — PANTOPRAZOLE SODIUM 40 MG/1
40 TABLET, DELAYED RELEASE ORAL
Qty: 90 TABLET | Refills: 3 | Status: SHIPPED | OUTPATIENT
Start: 2024-05-29

## (undated) DIAGNOSIS — J06.9 UPPER RESPIRATORY INFECTION WITH COUGH AND CONGESTION: Primary | ICD-10-CM

## (undated) DEVICE — ENDOSCOPY PACK UPPER: Brand: MEDLINE INDUSTRIES, INC.

## (undated) DEVICE — 3M™ RED DOT™ MONITORING ELECTRODE WITH FOAM TAPE AND STICKY GEL, 50/BAG, 20/CASE, 72/PLT 2570: Brand: RED DOT™

## (undated) DEVICE — 1200CC GUARDIAN II: Brand: GUARDIAN

## (undated) DEVICE — Device: Brand: DEFENDO AIR/WATER/SUCTION AND BIOPSY VALVE

## (undated) DEVICE — FILTERLINE NASAL ADULT O2/CO2

## (undated) DEVICE — FORCEP BIOPSY RJ4 LG CAP W/ND

## (undated) DEVICE — MEDI-VAC NON-CONDUCTIVE SUCTION TUBING: Brand: CARDINAL HEALTH

## (undated) DEVICE — MEDI-VAC SUCTION HANDLE REGULAR CAPACITY: Brand: CARDINAL HEALTH

## (undated) NOTE — MR AVS SNAPSHOT
[place]  [address] [facility] [address]  [address] [id number] [phone]               Thank you for choosing us for your health care visit with Vika Khan MD.  We are glad to serve you and happy to provide you with this summ Recommended Websites for Advanced Directives    [employer]. org/publications/Documents/personal_dec. pdf  An information packet, including necessary form from the [employer] website. http://www. [place].state. [place]/public/books/advin If you have questions, you can call [phone] to talk to our [facility] Staff. Remember, [employer] is NOT to be used for urgent needs. For medical emergencies, dial 911. Visit https://[employer]. [facility]. org to learn more.         Educational Infor

## (undated) NOTE — Clinical Note
Hello,    Patient was just discharged with UGIB 2/2 gastric ulcer and C diff. She states she is not sure if she is taking her daily ASA and was told to resume her eliquis tomorrow. I want to clarify her anti-coagulation plans. Please let me know.     Maryjo Stanton

## (undated) NOTE — LETTER
Renata Carver [address]  [address] [facility] [address] [facility] [address] [facility]  Authorization for Surgical Operation and Procedure     Date:___________                                                                                                         Time:__________ that can occur: fever and allergic reactions, hemolytic reactions, transmission of diseases such as Hepatitis, AIDS and Cytomegalovirus (CMV) and fluid overload.   In the event that I wish to have an autologous transfusion of my own blood, or a directed don when the applicable recovery period ends for purposes of reinstating the DNAR order.   10. Patients having a sterilization procedure: I understand that if the procedure is successful the results will be permanent and it will therefore be impossible for me t doctor) to give me medicine and do additional procedures as necessary.  Some examples are: Starting or using an “IV” to give me medicine, fluids or blood during my procedure, and having a breathing tube placed to help me breathe when I’m asleep (intubation) understand that rare but potential complications include headache, bleeding, infection, seizure, irregular heart rhythms, and nerve injury.     I can change my mind about having anesthesia services at any time before I get the medicine.    _________________

## (undated) NOTE — LETTER
[address] [facility] FOR BLOOD OR BLOOD COMPONENTS      In the course of your treatment, it may become necessary to administer a transfusion of blood or blood components.  This form provides basic information concerning this proc alternatives to you if it has not already been done. I,Angelique Castle, have read/had read to me the above. I understand the matters bearing on the decision whether or not to authorize a transfusion of blood or blood components.  I have no questions whi

## (undated) NOTE — LETTER
03/03/21        [address]       Dear Herminio Brasher,    [address] records indicate that you have outstanding lab work and or testing that was ordered for you and has not yet been completed:  Fasting Lab Work   To sc

## (undated) NOTE — LETTER
February 4, 2020    [address]    Dear Harlan Hunt: It was a pleasure speaking with you over the phone recently.  To follow up, I wanted to send you some contact information to utilize when you have a questio